# Patient Record
Sex: MALE | Race: ASIAN | Employment: UNEMPLOYED | URBAN - METROPOLITAN AREA
[De-identification: names, ages, dates, MRNs, and addresses within clinical notes are randomized per-mention and may not be internally consistent; named-entity substitution may affect disease eponyms.]

---

## 2024-10-29 ENCOUNTER — OFFICE VISIT (OUTPATIENT)
Dept: FAMILY MEDICINE CLINIC | Facility: CLINIC | Age: 34
End: 2024-10-29
Payer: MEDICAID

## 2024-10-29 VITALS
TEMPERATURE: 99 F | DIASTOLIC BLOOD PRESSURE: 64 MMHG | HEIGHT: 68 IN | SYSTOLIC BLOOD PRESSURE: 112 MMHG | HEART RATE: 118 BPM | OXYGEN SATURATION: 97 % | WEIGHT: 124 LBS | BODY MASS INDEX: 18.79 KG/M2

## 2024-10-29 DIAGNOSIS — F41.1 GAD (GENERALIZED ANXIETY DISORDER): ICD-10-CM

## 2024-10-29 DIAGNOSIS — L85.3 XEROSIS OF SKIN: ICD-10-CM

## 2024-10-29 DIAGNOSIS — R39.15 URINARY URGENCY: ICD-10-CM

## 2024-10-29 DIAGNOSIS — E78.00 HYPERCHOLESTEROLEMIA: ICD-10-CM

## 2024-10-29 DIAGNOSIS — R00.0 TACHYCARDIA: ICD-10-CM

## 2024-10-29 DIAGNOSIS — Z00.00 ROUTINE MEDICAL EXAM: ICD-10-CM

## 2024-10-29 DIAGNOSIS — E11.29 TYPE 2 DIABETES MELLITUS WITH DIABETIC MICROALBUMINURIA, WITHOUT LONG-TERM CURRENT USE OF INSULIN (HCC): Primary | ICD-10-CM

## 2024-10-29 DIAGNOSIS — F90.0 ATTENTION DEFICIT HYPERACTIVITY DISORDER (ADHD), PREDOMINANTLY INATTENTIVE TYPE: ICD-10-CM

## 2024-10-29 DIAGNOSIS — F32.A DEPRESSION, UNSPECIFIED DEPRESSION TYPE: ICD-10-CM

## 2024-10-29 DIAGNOSIS — R80.9 TYPE 2 DIABETES MELLITUS WITH DIABETIC MICROALBUMINURIA, WITHOUT LONG-TERM CURRENT USE OF INSULIN (HCC): Primary | ICD-10-CM

## 2024-10-29 LAB
ATRIAL RATE: 106 BPM
P AXIS: 68 DEGREES
P-R INTERVAL: 126 MS
Q-T INTERVAL: 312 MS
QRS DURATION: 82 MS
QTC CALCULATION (BEZET): 414 MS
R AXIS: 88 DEGREES
T AXIS: 64 DEGREES
VENTRICULAR RATE: 106 BPM

## 2024-10-29 PROCEDURE — 99205 OFFICE O/P NEW HI 60 MIN: CPT | Performed by: STUDENT IN AN ORGANIZED HEALTH CARE EDUCATION/TRAINING PROGRAM

## 2024-10-29 PROCEDURE — 99417 PROLNG OP E/M EACH 15 MIN: CPT | Performed by: STUDENT IN AN ORGANIZED HEALTH CARE EDUCATION/TRAINING PROGRAM

## 2024-10-29 PROCEDURE — 93000 ELECTROCARDIOGRAM COMPLETE: CPT | Performed by: STUDENT IN AN ORGANIZED HEALTH CARE EDUCATION/TRAINING PROGRAM

## 2024-10-29 RX ORDER — ROSUVASTATIN CALCIUM 10 MG/1
10 TABLET, COATED ORAL
COMMUNITY
End: 2024-10-29

## 2024-10-29 RX ORDER — DEXTROAMPHETAMINE SACCHARATE, AMPHETAMINE ASPARTATE MONOHYDRATE, DEXTROAMPHETAMINE SULFATE AND AMPHETAMINE SULFATE 6.25; 6.25; 6.25; 6.25 MG/1; MG/1; MG/1; MG/1
25 CAPSULE, EXTENDED RELEASE ORAL
COMMUNITY
End: 2024-10-29

## 2024-10-29 RX ORDER — ROSUVASTATIN CALCIUM 10 MG/1
10 TABLET, COATED ORAL NIGHTLY
Qty: 90 TABLET | Refills: 0 | Status: SHIPPED | OUTPATIENT
Start: 2024-10-29

## 2024-10-29 RX ORDER — METFORMIN HYDROCHLORIDE 500 MG/1
1000 TABLET, EXTENDED RELEASE ORAL
COMMUNITY
End: 2024-10-29

## 2024-10-29 RX ORDER — DEXTROAMPHETAMINE SACCHARATE, AMPHETAMINE ASPARTATE, DEXTROAMPHETAMINE SULFATE AND AMPHETAMINE SULFATE 2.5; 2.5; 2.5; 2.5 MG/1; MG/1; MG/1; MG/1
TABLET ORAL DAILY
COMMUNITY
Start: 2024-08-05

## 2024-10-29 RX ORDER — GUANFACINE 3 MG/1
3 TABLET, EXTENDED RELEASE ORAL DAILY
Qty: 30 TABLET | Refills: 0 | Status: SHIPPED | OUTPATIENT
Start: 2024-10-29

## 2024-10-29 RX ORDER — DEXTROAMPHETAMINE SACCHARATE, AMPHETAMINE ASPARTATE MONOHYDRATE, DEXTROAMPHETAMINE SULFATE AND AMPHETAMINE SULFATE 6.25; 6.25; 6.25; 6.25 MG/1; MG/1; MG/1; MG/1
25 CAPSULE, EXTENDED RELEASE ORAL EVERY MORNING
Qty: 30 CAPSULE | Refills: 0 | Status: SHIPPED | OUTPATIENT
Start: 2024-10-29

## 2024-10-29 RX ORDER — CLOBETASOL PROPIONATE 0.5 MG/G
OINTMENT TOPICAL 2 TIMES DAILY
COMMUNITY
Start: 2024-10-04

## 2024-10-29 RX ORDER — VERAPAMIL HYDROCHLORIDE 120 MG/1
120 CAPSULE, EXTENDED RELEASE ORAL
COMMUNITY
End: 2024-10-29

## 2024-10-29 RX ORDER — OMEGA-3-ACID ETHYL ESTERS 1 G/1
1 CAPSULE, LIQUID FILLED ORAL
COMMUNITY

## 2024-10-29 RX ORDER — FLUTICASONE PROPIONATE 50 MCG
SPRAY, SUSPENSION (ML) NASAL
COMMUNITY
End: 2024-10-30

## 2024-10-29 RX ORDER — LEVOMILNACIPRAN HYDROCHLORIDE 120 MG/1
120 CAPSULE, EXTENDED RELEASE ORAL DAILY
Qty: 30 CAPSULE | Refills: 0 | Status: SHIPPED | OUTPATIENT
Start: 2024-10-29

## 2024-10-29 RX ORDER — BLOOD SUGAR DIAGNOSTIC
STRIP MISCELLANEOUS DAILY
COMMUNITY
Start: 2023-05-18

## 2024-10-29 RX ORDER — DEXTROAMPHETAMINE SACCHARATE, AMPHETAMINE ASPARTATE, DEXTROAMPHETAMINE SULFATE AND AMPHETAMINE SULFATE 5; 5; 5; 5 MG/1; MG/1; MG/1; MG/1
20 TABLET ORAL
COMMUNITY
End: 2024-10-29

## 2024-10-29 RX ORDER — ROSUVASTATIN CALCIUM 5 MG/1
5 TABLET, COATED ORAL EVERY EVENING
COMMUNITY
Start: 2024-10-02 | End: 2024-10-29

## 2024-10-29 RX ORDER — METFORMIN HYDROCHLORIDE EXTENDED-RELEASE TABLETS 1000 MG/1
1000 TABLET, FILM COATED, EXTENDED RELEASE ORAL DAILY
COMMUNITY
Start: 2024-05-09 | End: 2024-10-29

## 2024-10-29 RX ORDER — BUSPIRONE HYDROCHLORIDE 10 MG/1
10 TABLET ORAL 2 TIMES DAILY
COMMUNITY
End: 2024-10-29

## 2024-10-29 RX ORDER — LISDEXAMFETAMINE DIMESYLATE 50 MG
50 CAPSULE ORAL DAILY
Qty: 30 CAPSULE | Refills: 0 | Status: SHIPPED | OUTPATIENT
Start: 2024-10-29 | End: 2024-10-30

## 2024-10-29 RX ORDER — LEVOMILNACIPRAN HYDROCHLORIDE 120 MG/1
120 CAPSULE, EXTENDED RELEASE ORAL
COMMUNITY
End: 2024-10-29

## 2024-10-29 RX ORDER — BUSPIRONE HYDROCHLORIDE 10 MG/1
10 TABLET ORAL DAILY
Qty: 30 TABLET | Refills: 0 | Status: SHIPPED | OUTPATIENT
Start: 2024-10-29

## 2024-10-29 RX ORDER — GUANFACINE 3 MG/1
3 TABLET, EXTENDED RELEASE ORAL DAILY
COMMUNITY
End: 2024-10-29

## 2024-10-29 RX ORDER — LISDEXAMFETAMINE DIMESYLATE 50 MG
50 CAPSULE ORAL
COMMUNITY
End: 2024-10-29

## 2024-10-29 RX ORDER — METFORMIN HYDROCHLORIDE 500 MG/1
1000 TABLET, EXTENDED RELEASE ORAL
Qty: 180 TABLET | Refills: 0 | Status: SHIPPED | OUTPATIENT
Start: 2024-10-29

## 2024-10-29 NOTE — PROGRESS NOTES
Subjective:   Jamal Irving is a 33 year old male who presents for Medication Follow-Up and Establish Care     33-year-old male coming in to establish care.  Used to follow-up with PCP and psychiatry in [place].    History of Type II DM, hyperlipidemia, ADHD, depression and anxiety.  Patient is a mental health counselor, currently changing careers.  After establishing with psychiatry, they started managing ADHD, depression and anxiety.  Has been on ADHD treatment since 2009. Taking Adderall XR 25 mg in the morning and Vyvanse 50 mg in the afternoon.  Also has a prescription for Adderall IR to use as needed which he has not been doing so recently.  Patient was noted to be tachycardic and states that this has been going on for approximately 4 years.  Had EKGs done that where sinus tachycardia, never referred to cardiology.  Was recommended to consider verapamil through previous PCP however did not yet initiate.  Over the past 2 months has been working out more by doing weight training.  Occasionally would feel as if muscles are weaker.  Concerned about correlation with metformin.    States historically was on atorvastatin but did not feel well on it so was switched to rosuvastatin which he has been tolerating well.  Recent microalbumin/creatinine ratio elevated however not yet started on ACE/ARB.  States up-to-date on Tdap (2 years ago).  Over the past month and a half has noted urinary urgency with no frequency.  Describes it as a sensation of having to urinate immediately with inability to hold it.  Was seen in immediate care in [place], had a urinalysis and urine culture that were negative.  Denies any concern for STDs, dysuria, hematuria.  History of dry skin around wintertime.  Saw dermatology in 2013 and was advised to hydrate the skin.  Currently using CeraVe.    History/Other:    Chief Complaint Reviewed and Verified  No Further Nursing Notes to   Review  Tobacco Reviewed  Allergies Reviewed  Medications  Reviewed    Problem List Reviewed  Medical History Reviewed  Surgical History   Reviewed  Family History Reviewed  Social History Reviewed         Tobacco:  He has never smoked tobacco.    Current Outpatient Medications   Medication Sig Dispense Refill    clobetasol 0.05 % External Ointment Apply topically 2 (two) times daily.      omega-3-acid ethyl esters 1 g Oral Cap Take 1 capsule (1 g total) by mouth.      amphetamine-dextroamphetamine 10 MG Oral Tab Take by mouth daily.      Amphetamine-Dextroamphet ER 25 MG Oral Capsule SR 24 Hr Take 1 capsule (25 mg total) by mouth every morning. 30 capsule 0    busPIRone 10 MG Oral Tab Take 1 tablet (10 mg total) by mouth daily. 30 tablet 0    FETZIMA 120 MG Oral Capsule SR 24 Hr Take 120 mg by mouth daily. 30 capsule 0    guanFACINE HCl ER 3 MG Oral Tablet 24 Hr Take 1 tablet (3 mg total) by mouth daily. 30 tablet 0    metFORMIN  MG Oral Tablet 24 Hr Take 2 tablets (1,000 mg total) by mouth daily with breakfast. 180 tablet 0    rosuvastatin 10 MG Oral Tab Take 1 tablet (10 mg total) by mouth nightly. 90 tablet 0    VYVANSE 50 MG Oral Cap Take 1 capsule (50 mg total) by mouth daily. 30 capsule 0    fluticasone propionate 50 MCG/ACT Nasal Suspension       Glucose Blood (PRECISION QID TEST) In Vitro Strip daily. (Patient not taking: Reported on 10/29/2024)           Review of Systems:  Review of Systems   Constitutional:  Negative for chills, diaphoresis and fever.   HENT:  Negative for congestion, ear discharge, ear pain, sinus pressure, sinus pain and sore throat.    Eyes:  Negative for pain and discharge.   Respiratory:  Negative for cough, chest tightness, shortness of breath and wheezing.    Cardiovascular:  Negative for chest pain and palpitations.   Gastrointestinal:  Negative for abdominal pain, diarrhea, nausea and vomiting.   Endocrine: Negative for cold intolerance and heat intolerance.   Genitourinary:  Negative for dysuria, flank pain, frequency and  urgency.   Musculoskeletal:  Negative for joint swelling.        Mild muscular discomfort.   Skin:  Negative for rash.        Dry skin.   Neurological:  Negative for dizziness, syncope and headaches.   Psychiatric/Behavioral:  Negative for confusion and hallucinations.        Objective:   /64 (BP Location: Left arm, Patient Position: Sitting, Cuff Size: adult)   Pulse 118   Temp 98.8 °F (37.1 °C) (Oral)   Ht 5' 8\" (1.727 m)   Wt 124 lb (56.2 kg)   SpO2 97%   BMI 18.85 kg/m²  Estimated body mass index is 18.85 kg/m² as calculated from the following:    Height as of this encounter: 5' 8\" (1.727 m).    Weight as of this encounter: 124 lb (56.2 kg).  Physical Exam  Constitutional:       General: He is not in acute distress.     Appearance: Normal appearance. He is not ill-appearing or toxic-appearing.   HENT:      Head: Normocephalic and atraumatic.      Right Ear: Tympanic membrane and ear canal normal.      Left Ear: Tympanic membrane and ear canal normal.      Mouth/Throat:      Mouth: Mucous membranes are moist.      Pharynx: Oropharynx is clear. No oropharyngeal exudate or posterior oropharyngeal erythema.   Cardiovascular:      Rate and Rhythm: Normal rate and regular rhythm.      Heart sounds: Normal heart sounds. No murmur heard.     No gallop.   Pulmonary:      Effort: Pulmonary effort is normal. No respiratory distress.      Breath sounds: Normal breath sounds. No stridor. No wheezing, rhonchi or rales.   Abdominal:      General: Bowel sounds are normal.      Palpations: Abdomen is soft.   Musculoskeletal:      Cervical back: Normal range of motion and neck supple.   Skin:     General: Skin is warm and dry.      Comments: Dry, scaly skin on bilateral upper and lower extremities   Neurological:      General: No focal deficit present.      Mental Status: He is alert and oriented to person, place, and time. Mental status is at baseline.   Psychiatric:         Mood and Affect: Mood normal.          Behavior: Behavior normal.         Thought Content: Thought content normal.         Judgment: Judgment normal.         Assessment & Plan:     1. Type 2 diabetes mellitus with diabetic microalbuminuria, without long-term current use of insulin ([facility]) (Primary)  Last A1c value was 5.7% done on 4/17/2024  -Repeat A1c.  Pending results may consider decreasing metformin dose.  -Diabetic eye exam and forward records.  -Repeat microalbumin/creatinine ratio should be repeated twice more over a three- to six-month period to confirm that the albumin excretion is persistently elevated.  -Initiate ACE/ARB pending above.  -     Comp Metabolic Panel (14); Future; Expected date: 10/29/2024  -     Hemoglobin A1C; Future; Expected date: 10/29/2024  -     Microalb/Creat Ratio, Random Urine; Future; Expected date: 10/29/2024  -     metFORMIN HCl ER; Take 2 tablets (1,000 mg total) by mouth daily with breakfast.  Dispense: 180 tablet; Refill: 0  2. Hypercholesterolemia  Well-controlled on rosuvastatin.  CPM.  -     Lipid Panel; Future; Expected date: 10/29/2024  -     Rosuvastatin Calcium; Take 1 tablet (10 mg total) by mouth nightly.  Dispense: 90 tablet; Refill: 0  3. SINGH (generalized anxiety disorder)  Stable, no SHIP.  CPM and reestablish care with psychiatry.  -     [facility] - EXTERNAL  -     busPIRone HCl; Take 1 tablet (10 mg total) by mouth daily.  Dispense: 30 tablet; Refill: 0  -     Fetzima; Take 120 mg by mouth daily.  Dispense: 30 capsule; Refill: 0  4. Depression, unspecified depression type  Stable, no SHIP.  CPM and reestablish care with psychiatry.  -     [facility] - EXTERNAL  -     Fetzima; Take 120 mg by mouth daily.  Dispense: 30 capsule; Refill: 0  5. Attention deficit hyperactivity disorder (ADHD), predominantly inattentive type  Patient on guanfacine, Adderall and Vyvanse.  States that ADHD is severe on lower doses or lesser medication.  -Discussed concern about tachycardia.  Will bridge patient  until follow-up with psychiatry and cardiology.  -     [facility] - EXTERNAL  -     Amphetamine-Dextroamphet ER; Take 1 capsule (25 mg total) by mouth every morning.  Dispense: 30 capsule; Refill: 0  -     guanFACINE HCl ER; Take 1 tablet (3 mg total) by mouth daily.  Dispense: 30 tablet; Refill: 0  -     Vyvanse; Take 1 capsule (50 mg total) by mouth daily.  Dispense: 30 capsule; Refill: 0  6. Tachycardia  Chronic.  States parents and sister also with tachycardia.  EKG in office sinus tachycardia at 106 bpm.  -May need treatment if continues to be on stimulants.  Cardiology follow-up.  -     ELECTROCARDIOGRAM, COMPLETE  -     CARDIO - INTERNAL  7. Urinary urgency  Was seen in immediate care at [place], urinalysis and urine culture negative.  DM well-controlled.  -KUB with urology follow-up  -      KIDNEY/BLADDER (CPT=76770); Future; Expected date: 10/29/2024  -     UROLOGY - INTERNAL  8. Xerosis of skin  -Use of mild cleansers.  -Routine use of skin moisturizers. Daily use of moisturizers, which contain substances that promote epidermal hydration.  -Avoid excessive and aggressive skin washing.  -Use of a humidifier.  -     DERM - INTERNAL  9. Routine medical exam  -     CBC, Platelet; No Differential; Future; Expected date: 10/29/2024  -     Comp Metabolic Panel (14); Future; Expected date: 10/29/2024  -     Hemoglobin A1C; Future; Expected date: 10/29/2024  -     Lipid Panel; Future; Expected date: 10/29/2024  -     TSH W Reflex To Free T4; Future; Expected date: 10/29/2024  -     Microalb/Creat Ratio, Random Urine; Future; Expected date: 10/29/2024          Return in about 4 months (around 2/28/2025).    Trevor Salgado MD, 10/29/2024, 6:29 PM           Time spent: 85 minutes, obtaining history, evaluating patient, discussing differential diagnosis, recommendations, diagnostic testing options, treatment options, risks and benefits of my recommendations, counseling patient, discussing prognosis/expectations,  and follow up with patient as well as completing documentation.

## 2024-10-31 ENCOUNTER — LAB ENCOUNTER (OUTPATIENT)
Dept: LAB | Facility: HOSPITAL | Age: 34
End: 2024-10-31
Attending: STUDENT IN AN ORGANIZED HEALTH CARE EDUCATION/TRAINING PROGRAM
Payer: MEDICAID

## 2024-10-31 ENCOUNTER — HOSPITAL ENCOUNTER (OUTPATIENT)
Dept: ULTRASOUND IMAGING | Age: 34
Discharge: HOME | End: 2024-10-31
Attending: STUDENT IN AN ORGANIZED HEALTH CARE EDUCATION/TRAINING PROGRAM
Payer: MEDICAID

## 2024-10-31 DIAGNOSIS — Z00.00 ROUTINE MEDICAL EXAM: ICD-10-CM

## 2024-10-31 DIAGNOSIS — R39.15 URINARY URGENCY: ICD-10-CM

## 2024-10-31 DIAGNOSIS — E78.00 HYPERCHOLESTEROLEMIA: ICD-10-CM

## 2024-10-31 DIAGNOSIS — E11.29 TYPE 2 DIABETES MELLITUS WITH DIABETIC MICROALBUMINURIA, WITHOUT LONG-TERM CURRENT USE OF INSULIN (HCC): ICD-10-CM

## 2024-10-31 DIAGNOSIS — R80.9 TYPE 2 DIABETES MELLITUS WITH DIABETIC MICROALBUMINURIA, WITHOUT LONG-TERM CURRENT USE OF INSULIN (HCC): ICD-10-CM

## 2024-10-31 LAB
ALBUMIN SERPL-MCNC: 4.7 G/DL (ref 3.2–4.8)
ALBUMIN/GLOB SERPL: 2.2 {RATIO} (ref 1–2)
ALP LIVER SERPL-CCNC: 52 U/L
ALT SERPL-CCNC: 18 U/L
ANION GAP SERPL CALC-SCNC: 6 MMOL/L (ref 0–18)
AST SERPL-CCNC: 17 U/L (ref ?–34)
BILIRUB SERPL-MCNC: 1.4 MG/DL (ref 0.3–1.2)
BUN BLD-MCNC: 16 MG/DL (ref 9–23)
CALCIUM BLD-MCNC: 9.8 MG/DL (ref 8.7–10.4)
CHLORIDE SERPL-SCNC: 106 MMOL/L (ref 98–112)
CHOLEST SERPL-MCNC: 115 MG/DL (ref ?–200)
CO2 SERPL-SCNC: 28 MMOL/L (ref 21–32)
CREAT BLD-MCNC: 1.04 MG/DL
CREAT UR-SCNC: 342.8 MG/DL
EGFRCR SERPLBLD CKD-EPI 2021: 97 ML/MIN/1.73M2 (ref 60–?)
ERYTHROCYTE [DISTWIDTH] IN BLOOD BY AUTOMATED COUNT: 12.3 %
EST. AVERAGE GLUCOSE BLD GHB EST-MCNC: 128 MG/DL (ref 68–126)
FASTING PATIENT LIPID ANSWER: YES
FASTING STATUS PATIENT QL REPORTED: YES
GLOBULIN PLAS-MCNC: 2.1 G/DL (ref 2–3.5)
GLUCOSE BLD-MCNC: 125 MG/DL (ref 70–99)
HBA1C MFR BLD: 6.1 % (ref ?–5.7)
HCT VFR BLD AUTO: 44.9 %
HDLC SERPL-MCNC: 57 MG/DL (ref 40–59)
HGB BLD-MCNC: 14.8 G/DL
LDLC SERPL CALC-MCNC: 47 MG/DL (ref ?–100)
MCH RBC QN AUTO: 28.3 PG (ref 26–34)
MCHC RBC AUTO-ENTMCNC: 33 G/DL (ref 31–37)
MCV RBC AUTO: 85.9 FL
MICROALBUMIN UR-MCNC: 6.8 MG/DL
MICROALBUMIN/CREAT 24H UR-RTO: 19.8 UG/MG (ref ?–30)
NONHDLC SERPL-MCNC: 58 MG/DL (ref ?–130)
OSMOLALITY SERPL CALC.SUM OF ELEC: 293 MOSM/KG (ref 275–295)
PLATELET # BLD AUTO: 235 10(3)UL (ref 150–450)
POTASSIUM SERPL-SCNC: 4.2 MMOL/L (ref 3.5–5.1)
PROT SERPL-MCNC: 6.8 G/DL (ref 5.7–8.2)
RBC # BLD AUTO: 5.23 X10(6)UL
SODIUM SERPL-SCNC: 140 MMOL/L (ref 136–145)
TRIGL SERPL-MCNC: 47 MG/DL (ref 30–149)
TSI SER-ACNC: 0.69 MIU/ML (ref 0.55–4.78)
VLDLC SERPL CALC-MCNC: 7 MG/DL (ref 0–30)
WBC # BLD AUTO: 4.7 X10(3) UL (ref 4–11)

## 2024-10-31 PROCEDURE — 80061 LIPID PANEL: CPT

## 2024-10-31 PROCEDURE — 85027 COMPLETE CBC AUTOMATED: CPT

## 2024-10-31 PROCEDURE — 82570 ASSAY OF URINE CREATININE: CPT

## 2024-10-31 PROCEDURE — 83036 HEMOGLOBIN GLYCOSYLATED A1C: CPT

## 2024-10-31 PROCEDURE — 84443 ASSAY THYROID STIM HORMONE: CPT

## 2024-10-31 PROCEDURE — 76770 US EXAM ABDO BACK WALL COMP: CPT | Performed by: STUDENT IN AN ORGANIZED HEALTH CARE EDUCATION/TRAINING PROGRAM

## 2024-10-31 PROCEDURE — 36415 COLL VENOUS BLD VENIPUNCTURE: CPT

## 2024-10-31 PROCEDURE — 80053 COMPREHEN METABOLIC PANEL: CPT

## 2024-10-31 PROCEDURE — 82043 UR ALBUMIN QUANTITATIVE: CPT

## 2024-11-04 ENCOUNTER — PATIENT MESSAGE (OUTPATIENT)
Dept: FAMILY MEDICINE CLINIC | Facility: CLINIC | Age: 34
End: 2024-11-04

## 2024-11-04 DIAGNOSIS — R80.9 TYPE 2 DIABETES MELLITUS WITH DIABETIC MICROALBUMINURIA, WITHOUT LONG-TERM CURRENT USE OF INSULIN (HCC): Primary | ICD-10-CM

## 2024-11-04 DIAGNOSIS — E11.29 TYPE 2 DIABETES MELLITUS WITH DIABETIC MICROALBUMINURIA, WITHOUT LONG-TERM CURRENT USE OF INSULIN (HCC): Primary | ICD-10-CM

## 2024-11-11 ENCOUNTER — TELEPHONE (OUTPATIENT)
Age: 34
End: 2024-11-11

## 2024-11-11 NOTE — TELEPHONE ENCOUNTER
Lvm . Patient made Video visit with eli. After looking at chart was not appropriate for eli . Eli suggested to see Beverly Cloud

## 2024-11-14 RX ORDER — OMEGA-3-ACID ETHYL ESTERS 1 G/1
1 CAPSULE, LIQUID FILLED ORAL DAILY
Qty: 30 CAPSULE | Refills: 0 | OUTPATIENT
Start: 2024-11-14

## 2024-11-14 RX ORDER — BLOOD SUGAR DIAGNOSTIC
STRIP MISCELLANEOUS DAILY
Refills: 0 | OUTPATIENT
Start: 2024-11-14

## 2024-11-14 NOTE — TELEPHONE ENCOUNTER
Patient is due for an appointment with Dr. Feldman if patient is establishing care    Pt requesting refill of   Requested Prescriptions     Pending Prescriptions Disp Refills    Glucose Blood (PRECISION QID TEST) In Vitro Strip  0     Sig: daily.     No future appointments.

## 2024-11-15 ENCOUNTER — LAB ENCOUNTER (OUTPATIENT)
Dept: LAB | Age: 34
End: 2024-11-15
Attending: STUDENT IN AN ORGANIZED HEALTH CARE EDUCATION/TRAINING PROGRAM
Payer: MEDICAID

## 2024-11-15 ENCOUNTER — OFFICE VISIT (OUTPATIENT)
Dept: INTERNAL MEDICINE CLINIC | Facility: CLINIC | Age: 34
End: 2024-11-15
Payer: MEDICAID

## 2024-11-15 VITALS
WEIGHT: 124.19 LBS | RESPIRATION RATE: 16 BRPM | BODY MASS INDEX: 18.82 KG/M2 | DIASTOLIC BLOOD PRESSURE: 62 MMHG | HEART RATE: 100 BPM | TEMPERATURE: 98 F | HEIGHT: 68 IN | SYSTOLIC BLOOD PRESSURE: 108 MMHG

## 2024-11-15 DIAGNOSIS — H57.9 EYE PRESSURE: ICD-10-CM

## 2024-11-15 DIAGNOSIS — L85.3 DRY SKIN DERMATITIS: ICD-10-CM

## 2024-11-15 DIAGNOSIS — E78.2 MIXED HYPERLIPIDEMIA: ICD-10-CM

## 2024-11-15 DIAGNOSIS — E11.9 TYPE 2 DIABETES MELLITUS WITHOUT COMPLICATION, WITHOUT LONG-TERM CURRENT USE OF INSULIN (HCC): Primary | ICD-10-CM

## 2024-11-15 DIAGNOSIS — R00.0 TACHYCARDIA: ICD-10-CM

## 2024-11-15 DIAGNOSIS — R73.09 ELEVATED HEMOGLOBIN A1C MEASUREMENT: ICD-10-CM

## 2024-11-15 DIAGNOSIS — J30.9 ALLERGIC RHINITIS, UNSPECIFIED SEASONALITY, UNSPECIFIED TRIGGER: ICD-10-CM

## 2024-11-15 DIAGNOSIS — F90.9 ATTENTION DEFICIT HYPERACTIVITY DISORDER (ADHD), UNSPECIFIED ADHD TYPE: ICD-10-CM

## 2024-11-15 PROBLEM — F41.9 ANXIETY DISORDER: Status: ACTIVE | Noted: 2018-08-01

## 2024-11-15 PROBLEM — L20.84 INTRINSIC ECZEMA: Status: ACTIVE | Noted: 2022-01-16

## 2024-11-15 PROBLEM — L65.9 HAIR THINNING: Status: ACTIVE | Noted: 2022-05-27

## 2024-11-15 LAB
T4 FREE SERPL-MCNC: 1.7 NG/DL (ref 0.8–1.7)
TSI SER-ACNC: 1.42 UIU/ML (ref 0.55–4.78)

## 2024-11-15 PROCEDURE — 84439 ASSAY OF FREE THYROXINE: CPT

## 2024-11-15 PROCEDURE — 36415 COLL VENOUS BLD VENIPUNCTURE: CPT

## 2024-11-15 PROCEDURE — 99205 OFFICE O/P NEW HI 60 MIN: CPT | Performed by: STUDENT IN AN ORGANIZED HEALTH CARE EDUCATION/TRAINING PROGRAM

## 2024-11-15 PROCEDURE — 84443 ASSAY THYROID STIM HORMONE: CPT

## 2024-11-15 RX ORDER — OMEGA-3-ACID ETHYL ESTERS 1 G/1
2 CAPSULE, LIQUID FILLED ORAL 2 TIMES DAILY
Qty: 180 CAPSULE | Refills: 0 | Status: SHIPPED | OUTPATIENT
Start: 2024-11-15

## 2024-11-15 RX ORDER — DEXTROAMPHETAMINE SACCHARATE, AMPHETAMINE ASPARTATE MONOHYDRATE, DEXTROAMPHETAMINE SULFATE AND AMPHETAMINE SULFATE 5; 5; 5; 5 MG/1; MG/1; MG/1; MG/1
CAPSULE, EXTENDED RELEASE ORAL
COMMUNITY

## 2024-11-15 RX ORDER — FLUTICASONE PROPIONATE 50 MCG
2 SPRAY, SUSPENSION (ML) NASAL DAILY
Qty: 1 EACH | Refills: 1 | Status: SHIPPED | OUTPATIENT
Start: 2024-11-15

## 2024-11-15 RX ORDER — BLOOD-GLUCOSE METER
KIT MISCELLANEOUS
COMMUNITY

## 2024-11-15 RX ORDER — LANCETS 33 GAUGE
EACH MISCELLANEOUS
COMMUNITY

## 2024-11-15 NOTE — PROGRESS NOTES
OFFICE NOTE     Patient ID: Jamal Irving is a 33 year old male.  Today's Date: 11/15/24  Chief Complaint: Lab Results and Diabetes    Patient is originally from [place]  Lived in [place] previously, I currently living in [place] for the past 3 months.   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  Not currently working.    # ADHD   # Depression and Anxiety  On treatment since 2009  Currently follows with Psychiatry, Hernando Zechariah  Takes Adderal XR (20 mg ) in am and then Vivance 50 mg .in the afternoon. Takes PRN Adderal 10 mg 1-2 times a week   Fetzima was increased to 120 mg   Also takes Guanfacine    # T2 DM  Father has diabetes since his 40s. Patient was discovered to have Diabetes during general check up.  Patient currently Takes Metformin 1000 mg a day  HbA1c (10/31/2024) - 6.1  Microalbumin/creat ratio 10/31/2024  was 19.8 (normal)  Working out, eats low carb diet     # HLD   Did not tolerate atorvastatin in the past  Currently taking Rosuvastatin 10 mg daily, tolerating well.  Lipid panel (10/31/2024): , HDL 57, TG 47, LDL 47    # Tachycardia  Patient tachycardic in the office today. States has elevated heart rate for about 4 years, was taking propranolol in the past, however it made him feel not well. It was suggested to try verapamil in the past as well.   Had EKG in the office today which showed sinus tachycardia, otherwise without significant abnormality.    # Dry skin dermatitis on b/l LE  Flairs up around wintertime.    Currently using CeraVe, however inconsistently.    Today he is also concerned about feeling of eye pressure. He would like to see Ophthalmology.       Vitals:    11/15/24 1250   BP: 108/62   Pulse: 100   Resp: 16   Temp: 97.7 °F (36.5 °C)   TempSrc: Temporal   Weight: 124 lb 3.2 oz (56.3 kg)   Height: 5' 8\" (1.727 m)     body mass index is 18.88  kg/m².  BP Readings from Last 3 Encounters:   11/15/24 108/62   10/29/24 112/64     The ASCVD Risk score (Kimani DK, et al., 2019) failed to calculate for the following reasons:    The 2019 ASCVD risk score is only valid for ages 40 to 79      Medications reviewed:  Current Outpatient Medications   Medication Sig Dispense Refill    Blood Glucose Monitoring Suppl (ONETOUCH VERIO REFLECT) w/Device Does not apply Kit       OneTouch Delica Lancets 33G Does not apply Misc       Amphetamine-Dextroamphet ER 20 MG Oral Capsule SR 24 Hr       Glucose Blood (ONETOUCH TEST VI)       fluticasone propionate 50 MCG/ACT Nasal Suspension 2 sprays by Each Nare route daily. 1 each 1    omega-3-acid ethyl esters 1 g Oral Cap Take 2 capsules (2 g total) by mouth 2 (two) times daily. 180 capsule 0    empagliflozin (JARDIANCE) 10 MG Oral Tab Take 1 tablet (10 mg total) by mouth daily. 90 tablet 0    empagliflozin (JARDIANCE) 10 MG Oral Tab Take 1 tablet (10 mg total) by mouth daily. JARDIANCE 10MG  LOT: number [id number]  NDC: 6126-5155-31  EXP DATE: 01/31/2026  : BOEHRINGER INGELVendRx 21 tablet 0    empagliflozin (JARDIANCE) 10 MG Oral Tab Take 1 tablet (10 mg total) by mouth daily. JARDIANCE 10MG  LOT: [id number]  NDC: 2771-7070-95  EXP DATE: 11/30/2026  : BOEHRINGER INGELHEIM 7 tablet 0    lisdexamfetamine (VYVANSE) 50 MG Oral Cap Take 1 capsule (50 mg total) by mouth daily. 30 capsule 0    clobetasol 0.05 % External Ointment Apply topically 2 (two) times daily.      amphetamine-dextroamphetamine 10 MG Oral Tab Take by mouth daily.      busPIRone 10 MG Oral Tab Take 1 tablet (10 mg total) by mouth daily. (Patient taking differently: Take 1 tablet (10 mg total) by mouth 2 (two) times daily.) 30 tablet 0    FETZIMA 120 MG Oral Capsule SR 24 Hr Take 120 mg by mouth daily. 30 capsule 0    guanFACINE HCl ER 3 MG Oral Tablet 24 Hr Take 1 tablet (3 mg total) by mouth daily. 30 tablet 0    metFORMIN  MG Oral Tablet 24  Hr Take 2 tablets (1,000 mg total) by mouth daily with breakfast. 180 tablet 0    rosuvastatin 10 MG Oral Tab Take 1 tablet (10 mg total) by mouth nightly. (Patient taking differently: Take 1 tablet (10 mg total) by mouth daily.) 90 tablet 0         Assessment & Plan    1. Type 2 diabetes mellitus without complication, without long-term current use of insulin ([facility]) (Primary)  Diabetes is currently well controled with recent HbA1c was 6.1. However patient would like a better control of HbA1c , which is appropriate given his age.  Will  start him on Jardiance 10 mg daily for the next month. If tolerates well, will continue with prescription and will recheck his Hb A1c in 3 months. Should check his am sugars several times a week, will send supplies.  -     Empagliflozin; Take 1 tablet (10 mg total) by mouth daily.  Dispense: 90 tablet; Refill: 0  -     Diabetic Test Strips and Supplies  -     Ophthalmology Referral - In Network    2. Mixed hyperlipidemia  Should continue current management with Omega 3 fatty acids and Rosuvastatin 10 mg daily  -     Omega-3-acid Ethyl Esters; Take 2 capsules (2 g total) by mouth 2 (two) times daily.  Dispense: 180 capsule; Refill: 0    3. Elevated hemoglobin A1c measurement  -     Diabetic Test Strips and Supplies    4. Tachycardia  EKG in the office today showed sinus tachycardia. Inappropriate tachycardia?  TSH was normal few months ago. Will check TSH again with free T4.  Did not tolerate Propranolol in the past. Patient was referred to cardiology in the past. Recommend patient to follow up with cardiology as a different b blocker or other agent could be initiated. Might need Holter monitoring to determine average heart rate and ECHO to assess cardiac function  -     Free T4, (Free Thyroxine); Future; Expected date: 11/15/2024  -     Assay, Thyroid Stim Hormone; Future; Expected date: 11/15/2024    5. Allergic rhinitis, unspecified seasonality, unspecified trigger  -     Fluticasone  Propionate; 2 sprays by Each Nare route daily.  Dispense: 1 each; Refill: 1    6. Eye pressure  Patient feels increased pressure in his eyes. Will refer to ophthalmology for further evaluation.  -     Ophthalmology Referral - In Network    7. Dry skin dermatitis  -     Derm Referral - In Network    8. ADHD   Continue follow up with Psychiatry     Other orders  Patient was provided with 4 weeks supply of Jardiance.  -     Empagliflozin; Take 1 tablet (10 mg total) by mouth daily. JARDIANCE 10MG  LOT: number [id number]  NDC: 3360-4317-08  EXP DATE: 01/31/2026  : MasterImage 3D  Dispense: 21 tablet; Refill: 0  -     Empagliflozin; Take 1 tablet (10 mg total) by mouth daily. JARDIANCE 10MG  LOT: [id number]  NDC: 0208-9328-04  EXP DATE: 11/30/2026  : MasterImage 3D  Dispense: 7 tablet; Refill: 0        Follow Up: As needed/if symptoms worsen or Return in about 3 months (around 2/15/2025) for diabetes follow up..     I spent 65 minutes obtaining pertitent medical history, reviewing pertinent imaging/labs and specialists notes, evaluating patient, discussing differential diagnosis' and various treatment options, reinforcing importance of compliance with treatment plan, and completing documentation.          There is a longitudinal care relationship with me, the care plan reflects the ongoing nature of the continuous relationship of care, and the medical record indicates that there is ongoing treatment of a serious/complex medical condition which I am currently managing.  is Applicable.     Objective/ Results:   Physical Exam  Constitutional:       General: He is not in acute distress.     Appearance: Normal appearance. He is not ill-appearing or toxic-appearing.   HENT:      Head: Normocephalic and atraumatic.      Mouth/Throat:      Pharynx: No oropharyngeal exudate or posterior oropharyngeal erythema.   Eyes:      Extraocular Movements: Extraocular movements intact.       Conjunctiva/sclera: Conjunctivae normal.      Pupils: Pupils are equal, round, and reactive to light.   Cardiovascular:      Rate and Rhythm: Regular rhythm. Tachycardia present.      Pulses:           Dorsalis pedis pulses are 2+ on the right side and 2+ on the left side.        Posterior tibial pulses are 2+ on the right side and 2+ on the left side.      Heart sounds: Normal heart sounds. No murmur heard.     No friction rub. No gallop.   Pulmonary:      Effort: Pulmonary effort is normal. No respiratory distress.      Breath sounds: Normal breath sounds. No stridor. No wheezing, rhonchi or rales.   Abdominal:      General: Abdomen is flat.   Musculoskeletal:         General: No swelling.      Cervical back: Normal range of motion. No rigidity.      Right lower leg: No edema.      Left lower leg: No edema.      Right foot: Normal range of motion. No deformity or foot drop.      Left foot: Normal range of motion.   Feet:      Right foot:      Protective Sensation:   7 sites sensed.      Skin integrity: Dry skin present.      Left foot:      Protective Sensation:   7 sites sensed.      Skin integrity: Dry skin present.   Lymphadenopathy:      Cervical: No cervical adenopathy.   Skin:     General: Skin is warm.      Capillary Refill: Capillary refill takes less than 2 seconds.      Coloration: Skin is not jaundiced.   Neurological:      General: No focal deficit present.      Mental Status: He is alert and oriented to person, place, and time. Mental status is at baseline.   Psychiatric:         Mood and Affect: Mood normal.         Behavior: Behavior normal.         Thought Content: Thought content normal.         Judgment: Judgment normal.        Bilateral barefoot skin diabetic exam is normal, visualized feet and the appearance is abnormal due to the presence of Dry skin dermatitis.  Bilateral monofilament/sensation of both feet is normal.  Pulsation pedal pulse exam of both lower legs/feet is normal as well.      Reviewed:    Patient Active Problem List    Diagnosis    Dry skin dermatitis    Eye pressure    Tachycardia    Mixed hyperlipidemia    Type 2 diabetes mellitus without complication, without long-term current use of insulin (HCC)    Hair thinning    Intrinsic eczema    Anxiety disorder    Depression    Attention deficit hyperactivity disorder (ADHD)      Allergies[1]     Social History     Socioeconomic History    Marital status: Single   Tobacco Use    Smoking status: Never     Passive exposure: Never    Smokeless tobacco: Never   Vaping Use    Vaping status: Never Used   Substance and Sexual Activity    Alcohol use: Not Currently     Comment: quit 6 years ago    Drug use: Never   Other Topics Concern    Caffeine Concern No    Exercise Yes     Comment: feels a little weaker    Seat Belt No    Special Diet No    Stress Concern No    Weight Concern No      Review of Systems   Constitutional: Negative.  Negative for fatigue and fever.   HENT: Negative.     Eyes: Negative.    Respiratory: Negative.     Cardiovascular:         Positive for constantly elevated heart rate   Gastrointestinal: Negative.  Negative for abdominal pain, constipation, diarrhea, nausea and vomiting.   Endocrine: Negative.    Skin:         Positive for Dry skin     All other systems negative unless otherwise stated in ROS or HPI above.       Irina Nowak MD  Internal Medicine       Call office with any questions or seek emergency care if necessary.   Patient understands and agrees to follow directions and advice.      ----------------------------------------- PATIENT INSTRUCTIONS-----------------------------------------     There are no Patient Instructions on file for this visit.        The 21st Century Cures Act makes medical notes available to patients in the interest of transparency.  However, please be advised that this is a medical document.  It is intended as a peer to peer communication.  It is written in medical language and may contain  abbreviations or verbiage that are technical and unfamiliar.  It may appear blunt or direct.  Medical documents are intended to carry relevant information, facts as evident, and the clinical opinion of the practitioner.        [1]   Allergies  Allergen Reactions    Bupropion ANXIETY and Tightness in Chest    Clonidine CONFUSION, DIZZINESS and FATIGUE    Omeprazole NAUSEA AND VOMITING    Propranolol DIZZINESS     Disorientation    Atomoxetine OTHER (SEE COMMENTS)    Carbamazepine OTHER (SEE COMMENTS)    Citalopram OTHER (SEE COMMENTS)     Not effective    Desipramine OTHER (SEE COMMENTS)     Excessive sleepiness    Methylphenidate OTHER (SEE COMMENTS)    Oxcarbazepine OTHER (SEE COMMENTS)    Thiothixene OTHER (SEE COMMENTS)    Vilazodone OTHER (SEE COMMENTS)    Escitalopram FATIGUE and INSOMNIA    Fluoxetine FATIGUE    Paroxetine FATIGUE    Sertraline FATIGUE    Venlafaxine FATIGUE

## 2024-11-18 ENCOUNTER — TELEPHONE (OUTPATIENT)
Dept: CARDIOLOGY | Age: 34
End: 2024-11-18

## 2024-11-20 ENCOUNTER — TELEPHONE (OUTPATIENT)
Dept: INTERNAL MEDICINE CLINIC | Facility: CLINIC | Age: 34
End: 2024-11-20

## 2024-11-20 DIAGNOSIS — R00.0 TACHYCARDIA: Primary | ICD-10-CM

## 2024-11-20 DIAGNOSIS — E78.2 MIXED HYPERLIPIDEMIA: ICD-10-CM

## 2024-11-20 NOTE — TELEPHONE ENCOUNTER
Insurance: medicaid  Provider's Name?: Dr Rg Britton   Provider's Specialty?: Cardio     Reason for Visit?: NP    Diagnosis?:    Number of Visits Requested?: 4    Last Visit with Specialist?: none   Is Appt. Already Scheduled?:  no        If so, Date?:    Once referral is approved [facility]

## 2024-11-20 NOTE — TELEPHONE ENCOUNTER
Incoming (mail or fax):  fax   Received from:  [facility]   Documentation given to:  triage in     Med records OV note, and any cardio testing   Sent to you and not med records not much for records needed

## 2024-11-21 PROBLEM — E11.9 TYPE 2 DIABETES MELLITUS WITHOUT COMPLICATION, WITHOUT LONG-TERM CURRENT USE OF INSULIN (HCC): Status: ACTIVE | Noted: 2023-02-10

## 2024-11-21 PROBLEM — R00.0 TACHYCARDIA: Status: ACTIVE | Noted: 2024-04-17

## 2024-11-21 PROBLEM — L85.3 DRY SKIN DERMATITIS: Status: ACTIVE | Noted: 2024-11-21

## 2024-11-21 PROBLEM — H57.9 EYE PRESSURE: Status: ACTIVE | Noted: 2024-11-21

## 2024-11-21 NOTE — TELEPHONE ENCOUNTER
Referral placed. Please finish the OV note from 11/15  Form in triage with EKG  Please let us know when OV note finished so we can fax it as well. Thank you

## 2024-11-22 NOTE — TELEPHONE ENCOUNTER
Faxed last OV note with our office, labs (ones done on 11/15 from our office and labs done on 10/31 by dr. Salgado), EKG, and referral per provider request. Faxed to keily sauceda at [phone] at dr. Britton office. Rec confirmation  For any questions call them at [phone]. Form sent for scanning

## 2024-11-25 ENCOUNTER — HOSPITAL ENCOUNTER (OUTPATIENT)
Dept: CT IMAGING | Facility: HOSPITAL | Age: 34
End: 2024-11-25
Attending: STUDENT IN AN ORGANIZED HEALTH CARE EDUCATION/TRAINING PROGRAM

## 2024-11-25 VITALS — BODY MASS INDEX: 18.79 KG/M2 | HEIGHT: 67.99 IN | WEIGHT: 124 LBS

## 2024-11-25 DIAGNOSIS — Z13.6 SCREENING FOR CARDIOVASCULAR CONDITION: ICD-10-CM

## 2024-11-26 DIAGNOSIS — R93.89 ABNORMAL CHEST CT: Primary | ICD-10-CM

## 2024-11-26 NOTE — PROGRESS NOTES
Date of Service 11/25/2024    CAROLYN ADAN  Date of Birth 12/6/1990    Patient Age: 33 year old    PCP: Irina Nowak MD  [address] [facility], [place]  [address] [place] [id number]    Heart Scan Consult  Preliminary Heart Scan Score: 0    Previous Screening  Heart Scan Completed Previously: No        Peripheral Vascular Scan Completed Previously: No          Risk Factors  Personal Risk Factors  Non-alterable Risk Factors: Family History (Father has high cholesterol, patient is diabetic)  Alterable Risk Factors: Diabetes;Stress      Body Mass Index  Body mass index is 18.86 kg/m².    Blood Pressure  Blood Pressure measurement declined during this encounter.  (Normal =< 120/80,  Elevated = 120-129/ >80,  High Stage1 130-139/80-89 , Stage2 >140/>90)    Lipid Profile  Cholesterol: 115, done on 10/31/2024.  HDL Cholesterol: 57, done on 10/31/2024.  LDL Cholesterol: 47, done on 10/31/2024.  TriGlycerides 47, done on 10/31/2024.    Cholesterol Goals  Value   Total  =< 200   HDL  = > 45 Men = > 55 Women   LDL   =< 100   Triglycerides  =< 150       Glucose and Hemoglobin A1C  Lab Results   Component Value Date     (H) 10/31/2024    A1C 6.1 (H) 10/31/2024     (Normal Fasting Glucose < 100mg/dl )    Nurse Review  Risk factor information and results reviewed with Nurse: Yes    Recommended Follow Up:  Consult your physician regarding:: Final Heart Scan Report;Discuss potential for Incidental Finding;Discuss Potential for Score Variance      Recommendations for Change:  Nutrition Changes: No Change Needed (Eats mostly chicken, some beef and fish. Some ghee and encouraged to choose another oil source)    Cholesterol Modification (goal of therapy depends upon your risk): No Change Needed    Exercise: Enhance Current Program (Uses weights and encouraged to increase cardio exercise.)    Smoking Cessation: No Change Needed    Weight Management: Maintain Current Weight    Stress Management: Adopt Stress Management Techniques    Repeat  Heart Scan: 5 years if Calcium Score is 0.0;Discuss with your Physician              [facility] Recommended Resources:  Recommended Resources: Upcoming Classes, Medical Services and Health Library [email or web address]            Shelby MENDEZ RN        Please Contact the Nurse Heart Line with any Questions or Concerns [phone].

## 2024-11-27 ENCOUNTER — TELEPHONE (OUTPATIENT)
Dept: INTERNAL MEDICINE CLINIC | Facility: CLINIC | Age: 34
End: 2024-11-27

## 2024-11-27 ENCOUNTER — TELEPHONE (OUTPATIENT)
Facility: CLINIC | Age: 34
End: 2024-11-27

## 2024-11-27 DIAGNOSIS — E11.9 TYPE 2 DIABETES MELLITUS WITHOUT COMPLICATION, WITHOUT LONG-TERM CURRENT USE OF INSULIN (HCC): Primary | ICD-10-CM

## 2024-11-27 DIAGNOSIS — Z13.5 SCREENING FOR EYE CONDITION: ICD-10-CM

## 2024-11-27 NOTE — TELEPHONE ENCOUNTER
Call from Jyoti at [facility] [phone], she states referral was put in wrong, no diagnosis, no destination, and it says enternal.

## 2024-11-27 NOTE — TELEPHONE ENCOUNTER
Received referral from Dr. Nowak's office for Abnormal chest CT. Shreya PSR notified. Per Shreya, patient will be seen by Dr. Carbone, patient placed on wait list.

## 2024-11-27 NOTE — TELEPHONE ENCOUNTER
Talked to [facility] and they need external referral. Referral placed. And faxed [phone].

## 2024-12-02 ENCOUNTER — TELEPHONE (OUTPATIENT)
Dept: INTERNAL MEDICINE CLINIC | Facility: CLINIC | Age: 34
End: 2024-12-02

## 2024-12-02 NOTE — TELEPHONE ENCOUNTER
Initiated PA but received this message automatically:Closed   12/2/2024  5:08 PM  Close reason: Prescription within prescribing limits. Prior Authorization not required.   Note from payer: No PA required, Prescription is within prescribing limits.   Payer: [employer] [facility] Case ID: [id number]    [phone]    Faxed back advising med is covered by plan and PA is not needed.

## 2024-12-02 NOTE — TELEPHONE ENCOUNTER
Incoming (mail or fax):  fax  Received from:  pill pack  Documentation given to:  triage in    PA for rosuvastatin calcium

## 2024-12-03 ENCOUNTER — OFFICE VISIT (OUTPATIENT)
Dept: INTERNAL MEDICINE CLINIC | Facility: CLINIC | Age: 34
End: 2024-12-03
Payer: MEDICAID

## 2024-12-03 VITALS
HEART RATE: 102 BPM | RESPIRATION RATE: 14 BRPM | OXYGEN SATURATION: 99 % | HEIGHT: 68 IN | BODY MASS INDEX: 18.49 KG/M2 | SYSTOLIC BLOOD PRESSURE: 98 MMHG | TEMPERATURE: 97 F | DIASTOLIC BLOOD PRESSURE: 68 MMHG | WEIGHT: 122 LBS

## 2024-12-03 DIAGNOSIS — R00.2 RAPID PALPITATIONS: Primary | ICD-10-CM

## 2024-12-03 DIAGNOSIS — E11.9 TYPE 2 DIABETES MELLITUS WITHOUT COMPLICATION, WITHOUT LONG-TERM CURRENT USE OF INSULIN (HCC): ICD-10-CM

## 2024-12-03 DIAGNOSIS — R42 LIGHTHEADEDNESS: ICD-10-CM

## 2024-12-03 DIAGNOSIS — I47.11 INAPPROPRIATE SINUS TACHYCARDIA (HCC): ICD-10-CM

## 2024-12-03 DIAGNOSIS — R68.89 EXERCISE INTOLERANCE: ICD-10-CM

## 2024-12-03 PROCEDURE — 99214 OFFICE O/P EST MOD 30 MIN: CPT | Performed by: STUDENT IN AN ORGANIZED HEALTH CARE EDUCATION/TRAINING PROGRAM

## 2024-12-03 NOTE — PROGRESS NOTES
OFFICE NOTE     Patient ID: Jamal Irving is a 33 year old male.  Today's Date: 12/03/24  Chief Complaint: Follow - Up (Pt has been experiencing tachycardia during workouts. Pt by Dr. Salgado 10/29/24 and had EKG done  Was referred to cardiologist./)    Patient is a 33-year-old male with PMH of ADHD, depression anxiety, type 2 diabetes, HLD, tachycardia, dry skin dermatitis and eczema of bilateral lower extremities, who presents to the office today for the evaluation of elevated heart rate and palpitations.    He had an episode when his HR was elevated to 180s. This happened while he was in the gym lifting weights , then suddenly felt palpitations , lightheaded and weak. no LOC. He did eat prior to exercising.  He needed to sit down and wait for a while for the symptoms to improve. He states that his father and his sister also have elevated Heart rate and they both take Bisoprolol. Patient is very concerned as his Heart rate is elevted at baseline this episodes with palpitations have happened in the past as well. He was on propranolol in the past, however did not tolerate it (dizziness and disorientation).  He never had Holter monitor in the past.   His most recent EKG on 10/29/2024 showe sinus tachycardia.     Today patient feeling better his HR in the office today is 102.   No chest pain, no SOB, no dizziness or lightheadedness.      Vitals:    12/03/24 1509   BP: 98/68   Pulse: 102   Resp: 14   Temp: 97.1 °F (36.2 °C)   TempSrc: Temporal   SpO2: 99%   Weight: 122 lb (55.3 kg)   Height: 5' 8\" (1.727 m)     body mass index is 18.55 kg/m².  BP Readings from Last 3 Encounters:   12/03/24 98/68   11/15/24 108/62   10/29/24 112/64     The ASCVD Risk score (Kimani HDZ, et al., 2019) failed to calculate for the following reasons:    The 2019 ASCVD risk score is only valid for ages 40 to 79      Medications reviewed:  Current Outpatient Medications   Medication Sig Dispense Refill    empagliflozin 10 MG Oral  Tab Take 1 tablet (10 mg total) by mouth daily. 90 tablet 1    rosuvastatin 10 MG Oral Tab Take 1 tablet (10 mg total) by mouth daily. 90 tablet 0    Blood Glucose Monitoring Suppl (ONETOUCH VERIO REFLECT) w/Device Does not apply Kit       OneTouch Delica Lancets 33G Does not apply Misc       Amphetamine-Dextroamphet ER 20 MG Oral Capsule SR 24 Hr       Glucose Blood (ONETOUCH TEST VI)       fluticasone propionate 50 MCG/ACT Nasal Suspension 2 sprays by Each Nare route daily. 1 each 1    omega-3-acid ethyl esters 1 g Oral Cap Take 2 capsules (2 g total) by mouth 2 (two) times daily. 180 capsule 0    lisdexamfetamine (VYVANSE) 50 MG Oral Cap Take 1 capsule (50 mg total) by mouth daily. 30 capsule 0    clobetasol 0.05 % External Ointment Apply topically 2 (two) times daily.      amphetamine-dextroamphetamine 10 MG Oral Tab Take by mouth as needed.      busPIRone 10 MG Oral Tab Take 1 tablet (10 mg total) by mouth daily. 30 tablet 0    FETZIMA 120 MG Oral Capsule SR 24 Hr Take 120 mg by mouth daily. 30 capsule 0    guanFACINE HCl ER 3 MG Oral Tablet 24 Hr Take 1 tablet (3 mg total) by mouth daily. 30 tablet 0    metFORMIN  MG Oral Tablet 24 Hr Take 2 tablets (1,000 mg total) by mouth daily with breakfast. 180 tablet 0         Assessment & Plan    1. Rapid palpitations (Primary)  2. Inappropriate sinus tachycardia (HCC)  3. Lightheadedness  4. Exercise intolerance  Patient with h/o elevated heart rate for years with recurrent episodes of palpitations with increased heart rate up to 180s during his regular exercising, associated with dizziness and lightheadedness. Patient had similar episodes in the past and was taking propranolol, however was unable to tolerate. He will benefit from Holter monitor for 7 days to establish baseline Heart rate and rule out underlying arrhythmias or SVT.   Patient will also see cardiology soon.   After cardiac monitor, will start him on appropriate treatment.  - CARD ZIO EXTENDED  MONITOR 3-7 DAYS (CPT=93242/34691); Future    5. Type 2 diabetes mellitus without complication, without long-term current use of insulin (HCC)  -     Empagliflozin; Take 1 tablet (10 mg total) by mouth daily.  Dispense: 90 tablet; Refill: 1      Follow Up: As needed/if symptoms worsen     I spent 30 minutes obtaining pertitent medical history, reviewing pertinent imaging/labs and specialists notes, evaluating patient, discussing differential diagnosis' and various treatment options, reinforcing importance of compliance with treatment plan, and completing documentation.     There is a longitudinal care relationship with me, the care plan reflects the ongoing nature of the continuous relationship of care, and the medical record indicates that there is ongoing treatment of a serious/complex medical condition which I am currently managing.  is Applicable.     Objective/ Results:   Physical Exam  Constitutional:       General: He is not in acute distress.     Appearance: Normal appearance. He is normal weight. He is not ill-appearing or toxic-appearing.   HENT:      Head: Normocephalic and atraumatic.   Eyes:      Extraocular Movements: Extraocular movements intact.      Conjunctiva/sclera: Conjunctivae normal.      Pupils: Pupils are equal, round, and reactive to light.   Cardiovascular:      Rate and Rhythm: Regular rhythm. Tachycardia present.      Pulses: Normal pulses.      Heart sounds: Normal heart sounds. No murmur heard.     No friction rub. No gallop.   Pulmonary:      Effort: Pulmonary effort is normal. No respiratory distress.      Breath sounds: Normal breath sounds. No wheezing or rales.   Abdominal:      General: Abdomen is flat.   Skin:     Capillary Refill: Capillary refill takes less than 2 seconds.   Neurological:      General: No focal deficit present.      Mental Status: He is alert and oriented to person, place, and time.      Cranial Nerves: No cranial nerve deficit.      Motor: No weakness.    Psychiatric:         Mood and Affect: Mood normal.         Behavior: Behavior normal.         Thought Content: Thought content normal.         Judgment: Judgment normal.        Reviewed:    Patient Active Problem List    Diagnosis    Inappropriate sinus tachycardia (HCC)    Rapid palpitations    Lightheadedness    Dry skin dermatitis    Eye pressure    Tachycardia    Mixed hyperlipidemia    Type 2 diabetes mellitus without complication, without long-term current use of insulin (HCC)    Hair thinning    Intrinsic eczema    Anxiety disorder    Depression    Attention deficit hyperactivity disorder (ADHD)      Allergies[1]     Social History     Socioeconomic History    Marital status: Single   Tobacco Use    Smoking status: Never     Passive exposure: Never    Smokeless tobacco: Never   Vaping Use    Vaping status: Never Used   Substance and Sexual Activity    Alcohol use: Not Currently     Comment: quit 6 years ago    Drug use: Never   Other Topics Concern    Caffeine Concern No    Exercise Yes     Comment: feels a little weaker    Seat Belt No    Special Diet No    Stress Concern No    Weight Concern No      Review of Systems   Constitutional: Negative.  Negative for fatigue and fever.   HENT: Negative.     Eyes: Negative.    Respiratory: Negative.  Negative for choking, chest tightness, shortness of breath and wheezing.    Cardiovascular:  Positive for palpitations. Negative for chest pain and leg swelling.        Positive for constantly elevated heart rate   Gastrointestinal: Negative.  Negative for abdominal pain, anal bleeding, blood in stool, constipation, diarrhea, nausea and vomiting.   Endocrine: Negative.    Genitourinary: Negative.    Musculoskeletal: Negative.    Skin:         Positive for Dry skin   Neurological:  Positive for dizziness (during the episode of palpitations) and light-headedness (while he had an episode of palpitations). Negative for tremors, seizures, syncope, speech difficulty,  weakness and numbness.   Hematological: Negative.    Psychiatric/Behavioral:  The patient is nervous/anxious.      All other systems negative unless otherwise stated in ROS or HPI above.       Irina Nowak MD  Internal Medicine       Call office with any questions or seek emergency care if necessary.   Patient understands and agrees to follow directions and advice.      ----------------------------------------- PATIENT INSTRUCTIONS-----------------------------------------     There are no Patient Instructions on file for this visit.        The 21st Century Cures Act makes medical notes available to patients in the interest of transparency.  However, please be advised that this is a medical document.  It is intended as a peer to peer communication.  It is written in medical language and may contain abbreviations or verbiage that are technical and unfamiliar.  It may appear blunt or direct.  Medical documents are intended to carry relevant information, facts as evident, and the clinical opinion of the practitioner.          [1]   Allergies  Allergen Reactions    Bupropion ANXIETY and Tightness in Chest    Clonidine CONFUSION, DIZZINESS and FATIGUE    Omeprazole NAUSEA AND VOMITING    Propranolol DIZZINESS     Disorientation    Atomoxetine OTHER (SEE COMMENTS)    Carbamazepine OTHER (SEE COMMENTS)    Citalopram OTHER (SEE COMMENTS)     Not effective    Desipramine OTHER (SEE COMMENTS)     Excessive sleepiness    Methylphenidate OTHER (SEE COMMENTS)    Oxcarbazepine OTHER (SEE COMMENTS)    Thiothixene OTHER (SEE COMMENTS)    Vilazodone OTHER (SEE COMMENTS)    Escitalopram FATIGUE and INSOMNIA    Fluoxetine FATIGUE    Paroxetine FATIGUE    Sertraline FATIGUE    Venlafaxine FATIGUE

## 2024-12-04 ENCOUNTER — PATIENT MESSAGE (OUTPATIENT)
Dept: INTERNAL MEDICINE CLINIC | Facility: CLINIC | Age: 34
End: 2024-12-04

## 2024-12-04 NOTE — TELEPHONE ENCOUNTER
Per OV notes:4. Exercise intolerance  Patient with h/o elevated heart rate for years with recurrent episodes of palpitations with increased heart rate up to 180s during his regular exercising, associated with dizziness and lightheadedness. Patient had similar episodes in the past and was taking propranolol, however was unable to tolerate. He will benefit from Holter monitor for 7 days to establish baseline Heart rate and rule out underlying arrhythmias or SVT.     Do you want pt to complete the 3-7 day holter monitor as ordered? Thanks!

## 2024-12-05 ENCOUNTER — TELEPHONE (OUTPATIENT)
Dept: INTERNAL MEDICINE CLINIC | Facility: CLINIC | Age: 34
End: 2024-12-05

## 2024-12-05 DIAGNOSIS — E11.29 TYPE 2 DIABETES MELLITUS WITH DIABETIC MICROALBUMINURIA, WITHOUT LONG-TERM CURRENT USE OF INSULIN (HCC): ICD-10-CM

## 2024-12-05 DIAGNOSIS — R80.9 TYPE 2 DIABETES MELLITUS WITH DIABETIC MICROALBUMINURIA, WITHOUT LONG-TERM CURRENT USE OF INSULIN (HCC): ICD-10-CM

## 2024-12-05 RX ORDER — METFORMIN HYDROCHLORIDE 500 MG/1
1000 TABLET, EXTENDED RELEASE ORAL
Qty: 180 TABLET | Refills: 0 | Status: SHIPPED | OUTPATIENT
Start: 2024-12-05

## 2024-12-05 NOTE — TELEPHONE ENCOUNTER
Pt asking for metformin to be send to [facility] by [facility].  I see metformin is prescribed by Dr Salgado  family practice. Ok to fill it ?     Rx pended

## 2024-12-05 NOTE — TELEPHONE ENCOUNTER
Incoming (mail or fax):  fax  Received from:  [facility]  Documentation given to:  Triage incoming    New prescription request

## 2024-12-06 ENCOUNTER — TELEPHONE (OUTPATIENT)
Dept: INTERNAL MEDICINE CLINIC | Facility: CLINIC | Age: 34
End: 2024-12-06

## 2024-12-06 NOTE — TELEPHONE ENCOUNTER
Faxed back signed form to [facility], fax confirmed and sent for scanning.

## 2024-12-06 NOTE — TELEPHONE ENCOUNTER
Incoming (mail or fax):  fax  Received from:  [place] [employer]  Documentation given to:  triage in    Prior auth for rosuvastatin calcium

## 2024-12-07 NOTE — TELEPHONE ENCOUNTER
Incoming (mail or fax):  fax  Received from:  [facility]  Documentation given to:  triage in      No review needed ok to fill

## 2024-12-09 ENCOUNTER — TELEPHONE (OUTPATIENT)
Dept: INTERNAL MEDICINE CLINIC | Facility: CLINIC | Age: 34
End: 2024-12-09

## 2024-12-09 NOTE — TELEPHONE ENCOUNTER
Incoming (mail or fax):  fax  Received from:  pill pack  Documentation given to:  triage in    Prior auth for omega 3,metformin,jardiance

## 2024-12-10 DIAGNOSIS — F90.0 ATTENTION DEFICIT HYPERACTIVITY DISORDER (ADHD), PREDOMINANTLY INATTENTIVE TYPE: ICD-10-CM

## 2024-12-10 RX ORDER — GUANFACINE 3 MG/1
3 TABLET, EXTENDED RELEASE ORAL DAILY
Qty: 30 TABLET | Refills: 0 | Status: SHIPPED | OUTPATIENT
Start: 2024-12-10

## 2024-12-10 NOTE — TELEPHONE ENCOUNTER
A refill request was received for:  Requested Prescriptions     Pending Prescriptions Disp Refills    GUANFACINE HCL ER 3 MG Oral Tablet 24 Hr [Pharmacy Med Name: Guanfacine Hydrochloride Er 24hr 3 Mg Tab Nataly] 30 tablet 0     Sig: Take 1 tablet by mouth daily.     Last refill date:  10/29/24  Qty: 30 and 0   Dx: ADHD  Last office visit: 10/29/24  When is follow up due: 2/28/25    Future Appointments   Date Time Provider Department Center   12/11/2024  3:00 [facility]   1/15/2025 11:20 AM Greg Carbone MD [facility] [id number] [facility] [place]

## 2024-12-11 ENCOUNTER — HOSPITAL ENCOUNTER (OUTPATIENT)
Dept: CV DIAGNOSTICS | Facility: HOSPITAL | Age: 34
Discharge: HOME | End: 2024-12-11
Attending: STUDENT IN AN ORGANIZED HEALTH CARE EDUCATION/TRAINING PROGRAM
Payer: MEDICAID

## 2024-12-11 DIAGNOSIS — I47.11 INAPPROPRIATE SINUS TACHYCARDIA (HCC): ICD-10-CM

## 2024-12-11 DIAGNOSIS — R68.89 EXERCISE INTOLERANCE: ICD-10-CM

## 2024-12-11 DIAGNOSIS — R42 LIGHTHEADEDNESS: ICD-10-CM

## 2024-12-11 DIAGNOSIS — R00.2 RAPID PALPITATIONS: ICD-10-CM

## 2024-12-11 PROCEDURE — 93243 EXT ECG>48HR<7D SCAN A/R: CPT | Performed by: STUDENT IN AN ORGANIZED HEALTH CARE EDUCATION/TRAINING PROGRAM

## 2024-12-11 PROCEDURE — 93242 EXT ECG>48HR<7D RECORDING: CPT | Performed by: STUDENT IN AN ORGANIZED HEALTH CARE EDUCATION/TRAINING PROGRAM

## 2024-12-12 ENCOUNTER — TELEPHONE (OUTPATIENT)
Dept: INTERNAL MEDICINE CLINIC | Facility: CLINIC | Age: 34
End: 2024-12-12

## 2024-12-12 DIAGNOSIS — E11.29 TYPE 2 DIABETES MELLITUS WITH DIABETIC MICROALBUMINURIA, WITHOUT LONG-TERM CURRENT USE OF INSULIN (HCC): ICD-10-CM

## 2024-12-12 DIAGNOSIS — E78.00 HYPERCHOLESTEROLEMIA: ICD-10-CM

## 2024-12-12 DIAGNOSIS — E78.2 MIXED HYPERLIPIDEMIA: ICD-10-CM

## 2024-12-12 DIAGNOSIS — R80.9 TYPE 2 DIABETES MELLITUS WITH DIABETIC MICROALBUMINURIA, WITHOUT LONG-TERM CURRENT USE OF INSULIN (HCC): ICD-10-CM

## 2024-12-12 NOTE — TELEPHONE ENCOUNTER
Incoming (mail or fax):  fax  Received from:  [facility]  Documentation given to:  triage incoming    Medication does not need a review at this time

## 2024-12-12 NOTE — TELEPHONE ENCOUNTER
Incoming (mail or fax):  fax  Received from:  [facility]  Documentation given to:  Triage incoming    Metformin

## 2024-12-13 RX ORDER — METFORMIN HYDROCHLORIDE 500 MG/1
1000 TABLET, EXTENDED RELEASE ORAL
Qty: 180 TABLET | Refills: 0 | OUTPATIENT
Start: 2024-12-13

## 2024-12-13 RX ORDER — OMEGA-3-ACID ETHYL ESTERS 1 G/1
2 CAPSULE, LIQUID FILLED ORAL 2 TIMES DAILY
Qty: 180 CAPSULE | Refills: 0 | OUTPATIENT
Start: 2024-12-13

## 2024-12-13 RX ORDER — ROSUVASTATIN CALCIUM 10 MG/1
10 TABLET, COATED ORAL NIGHTLY
Qty: 90 TABLET | Refills: 0 | OUTPATIENT
Start: 2024-12-13

## 2024-12-13 NOTE — TELEPHONE ENCOUNTER
Below signed by provider already faxed by other RN. Noted. Rec fax auth not needed. Faxed message to [employer] auth not needed. Rec confirmation. Fax from [facility] sent for scanning. Message sent to patient.

## 2024-12-16 RX ORDER — METFORMIN HYDROCHLORIDE 500 MG/1
1000 TABLET, EXTENDED RELEASE ORAL
Qty: 180 TABLET | Refills: 0 | Status: SHIPPED | OUTPATIENT
Start: 2024-12-16 | End: 2025-01-07

## 2024-12-16 RX ORDER — ROSUVASTATIN CALCIUM 10 MG/1
10 TABLET, COATED ORAL DAILY
Qty: 90 TABLET | Refills: 0 | Status: SHIPPED | OUTPATIENT
Start: 2024-12-16 | End: 2025-01-07

## 2024-12-17 ENCOUNTER — APPOINTMENT (OUTPATIENT)
Dept: CARDIOLOGY | Age: 34
End: 2024-12-17

## 2025-01-07 ENCOUNTER — OFFICE VISIT (OUTPATIENT)
Dept: INTERNAL MEDICINE CLINIC | Facility: CLINIC | Age: 35
End: 2025-01-07
Payer: MEDICAID

## 2025-01-07 VITALS
HEIGHT: 68 IN | TEMPERATURE: 97 F | SYSTOLIC BLOOD PRESSURE: 110 MMHG | HEART RATE: 105 BPM | RESPIRATION RATE: 16 BRPM | BODY MASS INDEX: 18.09 KG/M2 | WEIGHT: 119.38 LBS | DIASTOLIC BLOOD PRESSURE: 70 MMHG | OXYGEN SATURATION: 97 %

## 2025-01-07 DIAGNOSIS — E78.00 HYPERCHOLESTEROLEMIA: ICD-10-CM

## 2025-01-07 DIAGNOSIS — M67.979 TIBIALIS POSTERIOR TENDINOPATHY: ICD-10-CM

## 2025-01-07 DIAGNOSIS — I47.11 INAPPROPRIATE SINUS TACHYCARDIA (HCC): ICD-10-CM

## 2025-01-07 DIAGNOSIS — E11.9 TYPE 2 DIABETES MELLITUS WITHOUT COMPLICATION, WITHOUT LONG-TERM CURRENT USE OF INSULIN (HCC): Primary | ICD-10-CM

## 2025-01-07 DIAGNOSIS — L20.84 INTRINSIC ECZEMA: ICD-10-CM

## 2025-01-07 PROCEDURE — 99214 OFFICE O/P EST MOD 30 MIN: CPT | Performed by: STUDENT IN AN ORGANIZED HEALTH CARE EDUCATION/TRAINING PROGRAM

## 2025-01-07 RX ORDER — TRIAMCINOLONE ACETONIDE 1 MG/G
1 OINTMENT TOPICAL 2 TIMES DAILY
COMMUNITY

## 2025-01-07 RX ORDER — FLUTICASONE PROPIONATE 50 MCG
2 SPRAY, SUSPENSION (ML) NASAL DAILY
Qty: 11.1 ML | Refills: 1 | Status: SHIPPED | OUTPATIENT
Start: 2025-01-07 | End: 2026-01-02

## 2025-01-07 RX ORDER — ROSUVASTATIN CALCIUM 10 MG/1
10 TABLET, COATED ORAL DAILY
Qty: 90 TABLET | Refills: 0 | Status: SHIPPED | OUTPATIENT
Start: 2025-01-07

## 2025-01-07 RX ORDER — FLUOCINOLONE ACETONIDE 0.11 MG/ML
1 OIL TOPICAL DAILY
COMMUNITY
Start: 2024-12-05

## 2025-01-07 RX ORDER — BISOPROLOL FUMARATE 5 MG/1
2.5 TABLET, FILM COATED ORAL DAILY
Qty: 60 TABLET | Refills: 1 | Status: SHIPPED | OUTPATIENT
Start: 2025-01-07

## 2025-01-07 NOTE — PROGRESS NOTES
OFFICE NOTE     Patient ID: Jamal Irving is a 34 year old male.  Today's Date: 01/07/25  Chief Complaint: Medication Follow-Up and Foot Pain (Going on for 2 weeks, Right Inner Foot/Heel area, painful and feels like there's a \"pulling\" inside the area when walking)    PMH of ADHD, depression anxiety, type 2 diabetes, HLD, tachycardia, dry skin dermatitis and eczema of bilateral lower extremities, who presents to the office today for the evaluation of elevated heart rate and palpitations.    Recent Holter 12/11/2024 :INTERPRETATION: Total analysis time was 7 days and 0 hours. Underlying rhythm was sinus rhythm/sinus tachycardia with an average heart rate of 100 beats per minute, minimum 55 beats per minute, and maximum of 176 beats per minute. There were 3 patient diary events with symptoms listed as lightheaded, racing, and lightheaded again. All events were associated with sinus tachycardia. PAC and PVC burdens were less than 1%. No atrial fibrillation, ventricular arrhythmias or pauses were seen.     Also states that he recently went to [facility] and went to see a physician over there and his medications were switched around. Metformin was discontinued as patient had bloating with it. Increased Jardiance to 25 mg daily and was started on Linagliptin 5 mg daily. Bloating is jenny on this regiment.  Checks his BS in am:  125-128 range    His another concern today is the pain in the right foot , which has been there for about 2 weeks time and is bothering him, worsening when he walks, getting better with rest . Describes as pulling sensation.    Was recently seen by dermatology, diagnosed with eczema/atopic dermatitis, and skin biopsy showed early lichen simplex chronicus. started on Dupixent , first injection 12/20/2024. States it will take 2 months to see the full effect.    Vitals:    01/07/25 1321   BP: 110/70   Pulse: 105   Resp: 16   Temp: 97.2 °F (36.2 °C)   TempSrc: Temporal   SpO2: 97%    Weight: 119 lb 6.4 oz (54.2 kg)   Height: 5' 8\" (1.727 m)     body mass index is 18.15 kg/m².  BP Readings from Last 3 Encounters:   01/07/25 110/70   12/03/24 98/68   11/15/24 108/62     The ASCVD Risk score (Kimani DK, et al., 2019) failed to calculate for the following reasons:    The 2019 ASCVD risk score is only valid for ages 40 to 79      Medications reviewed:  Current Outpatient Medications   Medication Sig Dispense Refill    triamcinolone 0.1 % External Ointment Apply 1 Application topically 2 (two) times daily.      Dupilumab 300 MG/2ML Subcutaneous Solution Auto-injector Inject 300 mg into the skin every 14 (fourteen) days.      Fluocinolone Acetonide Scalp 0.01 % External Oil Apply 1 Application topically daily.      bisoprolol 5 MG Oral Tab Take 0.5 tablets (2.5 mg total) by mouth daily. 60 tablet 1    empagliflozin (JARDIANCE) 25 MG Oral Tab Take 1 tablet (25 mg total) by mouth daily. 90 tablet 0    linaGLIPtin 5 mg Oral Tab Take 1 tablet (5 mg total) by mouth daily. 90 tablet 0    rosuvastatin 10 MG Oral Tab Take 1 tablet (10 mg total) by mouth daily. 90 tablet 0    fluticasone propionate 50 MCG/ACT Nasal Suspension 2 sprays by Each Nare route daily. 11.1 mL 1    GUANFACINE HCL ER 3 MG Oral Tablet 24 Hr Take 1 tablet by mouth daily. 30 tablet 0    Blood Glucose Monitoring Suppl (ONETOUCH VERIO REFLECT) w/Device Does not apply Kit       OneTouch Delica Lancets 33G Does not apply Misc       Amphetamine-Dextroamphet ER 20 MG Oral Capsule SR 24 Hr Take 1 capsule (20 mg total) by mouth every morning.      Glucose Blood (ONETOUCH TEST VI)       omega-3-acid ethyl esters 1 g Oral Cap Take 2 capsules (2 g total) by mouth 2 (two) times daily. 180 capsule 0    lisdexamfetamine (VYVANSE) 50 MG Oral Cap Take 1 capsule (50 mg total) by mouth daily. 30 capsule 0    clobetasol 0.05 % External Ointment Apply topically 2 (two) times daily.      amphetamine-dextroamphetamine 10 MG Oral Tab Take by mouth as needed.       busPIRone 10 MG Oral Tab Take 1 tablet (10 mg total) by mouth daily. 30 tablet 0    FETZIMA 120 MG Oral Capsule SR 24 Hr Take 120 mg by mouth daily. 30 capsule 0         Assessment & Plan    1. Type 2 diabetes mellitus without complication, without long-term current use of insulin (HCC) (Primary)  Patient feels well on the current regiment. Will continue with that and will check HbA1c in 3 months.  -     Empagliflozin; Take 1 tablet (25 mg total) by mouth daily.  Dispense: 90 tablet; Refill: 0  -     linaGLIPtin; Take 1 tablet (5 mg total) by mouth daily.  Dispense: 90 tablet; Refill: 0  -     Hemoglobin A1C; Future; Expected date: 04/07/2025    2. Hypercholesterolemia  -     Rosuvastatin Calcium; Take 1 tablet (10 mg total) by mouth daily.  Dispense: 90 tablet; Refill: 0    3. Tibialis posterior tendinopathy  Symptoms and physical exam consistent with Posterior tibialis tonsillopathy  Will benefit from PT and RICE therapy.    -     Physical Therapy Referral - [facility]    4. Inappropriate sinus tachycardia (HCC)  Average rate 100 beats per minute. Patient has symptomatic when his HR goes up. I would like to start him on low dose bisoprolol and slowly titrate to 5 mg daily if tolerated well. He previously tried Propranolol, but it caused fatigue.   -     Bisoprolol Fumarate; Take 0.5 tablets (2.5 mg total) by mouth daily.  Dispense: 60 tablet; Refill: 1    5. Intrinsic eczema  Continue to follow with dermatology    Other orders  -     Fluticasone Propionate; 2 sprays by Each Nare route daily.  Dispense: 11.1 mL; Refill: 1      Follow Up: As needed/if symptoms worsen or Return in about 3 months (around 4/7/2025) for Diabetes follow up..     I spent 35 minutes obtaining pertitent medical history, reviewing pertinent imaging/labs and specialists notes, evaluating patient, discussing differential diagnosis' and various treatment options, reinforcing importance of compliance with treatment plan, and completing  documentation.        There is a longitudinal care relationship with me, the care plan reflects the ongoing nature of the continuous relationship of care, and the medical record indicates that there is ongoing treatment of a serious/complex medical condition which I am currently managing.  is Applicable.     Objective/ Results:   Physical Exam  Constitutional:       Appearance: Normal appearance. He is normal weight.   Eyes:      Extraocular Movements: Extraocular movements intact.      Conjunctiva/sclera: Conjunctivae normal.      Pupils: Pupils are equal, round, and reactive to light.   Cardiovascular:      Rate and Rhythm: Regular rhythm. Tachycardia present.      Pulses: Normal pulses.      Heart sounds: Normal heart sounds. No murmur heard.     No friction rub.   Pulmonary:      Effort: Pulmonary effort is normal. No respiratory distress.      Breath sounds: Normal breath sounds. No stridor. No wheezing or rhonchi.   Musculoskeletal:      Cervical back: Normal range of motion. No rigidity.      Right lower leg: No edema.      Left lower leg: No edema.      Right ankle: No swelling, deformity or ecchymosis. Tenderness (posterior to medial maleolus) present. Normal range of motion.      Right Achilles Tendon: Normal.      Left ankle: No swelling, deformity or ecchymosis. Normal range of motion.      Left Achilles Tendon: Normal.        Feet:    Lymphadenopathy:      Cervical: No cervical adenopathy.   Skin:     General: Skin is warm and dry.      Capillary Refill: Capillary refill takes less than 2 seconds.      Comments: Skin is very dry   Neurological:      General: No focal deficit present.      Mental Status: He is alert and oriented to person, place, and time.   Psychiatric:         Mood and Affect: Mood normal.         Behavior: Behavior normal.         Thought Content: Thought content normal.         Judgment: Judgment normal.        Reviewed:    Patient Active Problem List    Diagnosis     Inappropriate sinus tachycardia (HCC)    Rapid palpitations    Lightheadedness    Dry skin dermatitis    Eye pressure    Tachycardia    Mixed hyperlipidemia    Type 2 diabetes mellitus without complication, without long-term current use of insulin (HCC)    Hair thinning    Intrinsic eczema    Anxiety disorder    Depression    Attention deficit hyperactivity disorder (ADHD)      Allergies[1]     Social History     Socioeconomic History    Marital status: Single   Tobacco Use    Smoking status: Never     Passive exposure: Never    Smokeless tobacco: Never   Vaping Use    Vaping status: Never Used   Substance and Sexual Activity    Alcohol use: Not Currently     Comment: quit 6 years ago    Drug use: Not Currently   Other Topics Concern    Caffeine Concern No    Exercise Yes    Seat Belt No    Special Diet No    Stress Concern Yes    Weight Concern No      Review of Systems   Constitutional: Negative.    HENT: Negative.     Eyes: Negative.    Respiratory: Negative.     Cardiovascular: Negative.    Gastrointestinal: Negative.    Endocrine: Negative.    Genitourinary: Negative.    Musculoskeletal:         Positive for pain in th right medial ankle    Skin: Negative.    Neurological: Negative.    Hematological: Negative.      All other systems negative unless otherwise stated in ROS or HPI above.       Irina Nowak MD  Internal Medicine       Call office with any questions or seek emergency care if necessary.   Patient understands and agrees to follow directions and advice.      ----------------------------------------- PATIENT INSTRUCTIONS-----------------------------------------     There are no Patient Instructions on file for this visit.        The 21st Century Cures Act makes medical notes available to patients in the interest of transparency.  However, please be advised that this is a medical document.  It is intended as a peer to peer communication.  It is written in medical language and may contain abbreviations  or verbiage that are technical and unfamiliar.  It may appear blunt or direct.  Medical documents are intended to carry relevant information, facts as evident, and the clinical opinion of the practitioner.          [1]   Allergies  Allergen Reactions    Bupropion ANXIETY and Tightness in Chest    Clonidine CONFUSION, DIZZINESS and FATIGUE    Omeprazole NAUSEA AND VOMITING    Propranolol DIZZINESS     Disorientation    Atomoxetine OTHER (SEE COMMENTS)    Carbamazepine OTHER (SEE COMMENTS)    Citalopram OTHER (SEE COMMENTS)     Not effective    Desipramine OTHER (SEE COMMENTS)     Excessive sleepiness    Methylphenidate OTHER (SEE COMMENTS)    Oxcarbazepine OTHER (SEE COMMENTS)    Thiothixene OTHER (SEE COMMENTS)    Vilazodone OTHER (SEE COMMENTS)    Escitalopram FATIGUE and INSOMNIA    Fluoxetine FATIGUE    Paroxetine FATIGUE    Sertraline FATIGUE    Venlafaxine FATIGUE

## 2025-02-12 ENCOUNTER — E-ADVICE (OUTPATIENT)
Dept: CARDIOLOGY | Age: 35
End: 2025-02-12

## 2025-02-14 RX ORDER — BLOOD SUGAR DIAGNOSTIC
1 STRIP MISCELLANEOUS AS DIRECTED
Qty: 100 STRIP | Refills: 0 | Status: SHIPPED | OUTPATIENT
Start: 2025-02-14

## 2025-02-14 RX ORDER — LANCETS 33 GAUGE
1 EACH MISCELLANEOUS DAILY
Qty: 100 EACH | Refills: 0 | Status: SHIPPED | OUTPATIENT
Start: 2025-02-14

## 2025-03-11 DIAGNOSIS — E78.2 MIXED HYPERLIPIDEMIA: ICD-10-CM

## 2025-03-11 RX ORDER — OMEGA-3-ACID ETHYL ESTERS 1 G/1
2 CAPSULE, LIQUID FILLED ORAL 2 TIMES DAILY
Qty: 360 CAPSULE | Refills: 0 | Status: SHIPPED | OUTPATIENT
Start: 2025-03-11

## 2025-03-11 NOTE — TELEPHONE ENCOUNTER
Cholesterol Medication Protocol [id number]/11/2025 09:06 AM   Protocol Details ALT < 80    ALT resulted within past year    Lipid panel within past 12 months    In person appointment or virtual visit in the past 12 mos or appointment in next 3 mos    Medication is active on med list   . Hypercholesterolemia  -     Rosuvastatin Calcium; Take 1 tablet (10 mg total) by mouth daily.  Dispense: 90 tablet; Refill: 0  Future Appointments   Date Time Provider Department Center   5/19/2025  1:00 PM Irina Nowak MD [facility]

## 2025-03-13 NOTE — TELEPHONE ENCOUNTER
Incoming (mail or fax):  fax  Received from:  [employer]  Documentation given to:  Triage incoming    PA - Omega 3 acid ethyl esters

## 2025-03-14 ENCOUNTER — TELEPHONE (OUTPATIENT)
Dept: INTERNAL MEDICINE CLINIC | Facility: CLINIC | Age: 35
End: 2025-03-14

## 2025-03-14 NOTE — TELEPHONE ENCOUNTER
Indiana Hanson       3/13/25  3:21 PM  Note     Incoming (mail or fax):  fax  Received from:  [employer]  Documentation given to:  Triage incoming     PA - Omega 3 acid ethyl esters      Pa initiated and all questions answered. Awaiting insurance decision.

## 2025-03-15 NOTE — TELEPHONE ENCOUNTER
Incoming (mail or fax):  fax  Received from:  [facility]  Documentation given to:  Triage incoming    Coverage denied

## 2025-04-03 DIAGNOSIS — E11.9 TYPE 2 DIABETES MELLITUS WITHOUT COMPLICATION, WITHOUT LONG-TERM CURRENT USE OF INSULIN (HCC): ICD-10-CM

## 2025-04-03 RX ORDER — EMPAGLIFLOZIN 25 MG/1
25 TABLET, FILM COATED ORAL DAILY
Qty: 90 TABLET | Refills: 0 | Status: SHIPPED | OUTPATIENT
Start: 2025-04-03

## 2025-04-03 RX ORDER — LINAGLIPTIN 5 MG/1
5 TABLET, FILM COATED ORAL DAILY
Qty: 90 TABLET | Refills: 0 | Status: SHIPPED | OUTPATIENT
Start: 2025-04-03

## 2025-04-03 NOTE — TELEPHONE ENCOUNTER
Last office visit: 1/7/2025   Protocol: pass  Requested medication(s) are due for refill today: yes  Requested medication(s) are on the active medication list same strength, form, dose/ sig: yes  Requested medication(s) are managed by provider: yes  Patient has already received a courtsey refill: no    NOV: 5/19/2025  Last Labs: 11/15/2024  Asked to Return: 4/7/2025

## 2025-04-08 ENCOUNTER — LAB ENCOUNTER (OUTPATIENT)
Dept: LAB | Age: 35
End: 2025-04-08
Attending: STUDENT IN AN ORGANIZED HEALTH CARE EDUCATION/TRAINING PROGRAM
Payer: MEDICAID

## 2025-04-08 DIAGNOSIS — R80.9 TYPE 2 DIABETES MELLITUS WITH DIABETIC MICROALBUMINURIA, WITHOUT LONG-TERM CURRENT USE OF INSULIN (HCC): ICD-10-CM

## 2025-04-08 DIAGNOSIS — E11.29 TYPE 2 DIABETES MELLITUS WITH DIABETIC MICROALBUMINURIA, WITHOUT LONG-TERM CURRENT USE OF INSULIN (HCC): ICD-10-CM

## 2025-04-08 LAB
CREAT UR-SCNC: 107.5 MG/DL
EST. AVERAGE GLUCOSE BLD GHB EST-MCNC: 123 MG/DL (ref 68–126)
HBA1C MFR BLD: 5.9 % (ref ?–5.7)
MICROALBUMIN UR-MCNC: 0.4 MG/DL
MICROALBUMIN/CREAT 24H UR-RTO: 3.7 UG/MG (ref ?–30)

## 2025-04-08 PROCEDURE — 36415 COLL VENOUS BLD VENIPUNCTURE: CPT

## 2025-04-08 PROCEDURE — 82570 ASSAY OF URINE CREATININE: CPT

## 2025-04-08 PROCEDURE — 82043 UR ALBUMIN QUANTITATIVE: CPT

## 2025-04-08 PROCEDURE — 83036 HEMOGLOBIN GLYCOSYLATED A1C: CPT

## 2025-04-10 RX ORDER — FLUTICASONE PROPIONATE 50 MCG
2 SPRAY, SUSPENSION (ML) NASAL DAILY
Qty: 11.1 ML | Refills: 1 | Status: SHIPPED | OUTPATIENT
Start: 2025-04-10 | End: 2026-04-05

## 2025-04-10 NOTE — TELEPHONE ENCOUNTER
Allergy Medication Protocol [id number]/10/2025 01:45 PM   Protocol Details In person appointment or virtual visit in the past 12 mos or appointment in next 3 mos    Medication is active on med list

## 2025-04-15 RX ORDER — BUSPIRONE HYDROCHLORIDE 15 MG/1
TABLET ORAL
COMMUNITY

## 2025-04-15 RX ORDER — GUANFACINE 1 MG/1
TABLET, EXTENDED RELEASE ORAL
COMMUNITY
Start: 2025-02-03 | End: 2025-04-16

## 2025-04-15 RX ORDER — AZELASTINE HYDROCHLORIDE 137 UG/1
SPRAY, METERED NASAL
COMMUNITY
Start: 2025-01-21

## 2025-04-15 RX ORDER — OLOPATADINE HYDROCHLORIDE 665 UG/1
2 SPRAY NASAL 2 TIMES DAILY
COMMUNITY
Start: 2025-02-06 | End: 2025-04-16

## 2025-04-15 RX ORDER — FEXOFENADINE HCL 180 MG/1
TABLET ORAL
COMMUNITY

## 2025-04-16 ENCOUNTER — LAB ENCOUNTER (OUTPATIENT)
Dept: LAB | Facility: HOSPITAL | Age: 35
End: 2025-04-16
Attending: STUDENT IN AN ORGANIZED HEALTH CARE EDUCATION/TRAINING PROGRAM
Payer: MEDICAID

## 2025-04-16 ENCOUNTER — OFFICE VISIT (OUTPATIENT)
Dept: INTERNAL MEDICINE CLINIC | Facility: CLINIC | Age: 35
End: 2025-04-16
Payer: MEDICAID

## 2025-04-16 VITALS
BODY MASS INDEX: 18.64 KG/M2 | TEMPERATURE: 98 F | DIASTOLIC BLOOD PRESSURE: 68 MMHG | OXYGEN SATURATION: 99 % | SYSTOLIC BLOOD PRESSURE: 106 MMHG | RESPIRATION RATE: 14 BRPM | WEIGHT: 123 LBS | HEART RATE: 91 BPM | HEIGHT: 68 IN

## 2025-04-16 DIAGNOSIS — L85.3 DRY SKIN DERMATITIS: ICD-10-CM

## 2025-04-16 DIAGNOSIS — E78.2 MIXED HYPERLIPIDEMIA: ICD-10-CM

## 2025-04-16 DIAGNOSIS — R35.0 FREQUENCY OF URINATION: ICD-10-CM

## 2025-04-16 DIAGNOSIS — R79.89 ELEVATED TSH: ICD-10-CM

## 2025-04-16 DIAGNOSIS — L20.84 INTRINSIC ECZEMA: ICD-10-CM

## 2025-04-16 DIAGNOSIS — I47.11 INAPPROPRIATE SINUS TACHYCARDIA (HCC): ICD-10-CM

## 2025-04-16 DIAGNOSIS — F90.9 ATTENTION DEFICIT HYPERACTIVITY DISORDER (ADHD), UNSPECIFIED ADHD TYPE: ICD-10-CM

## 2025-04-16 DIAGNOSIS — F32.A DEPRESSION, UNSPECIFIED DEPRESSION TYPE: ICD-10-CM

## 2025-04-16 DIAGNOSIS — E11.9 TYPE 2 DIABETES MELLITUS WITHOUT COMPLICATION, WITHOUT LONG-TERM CURRENT USE OF INSULIN (HCC): Primary | ICD-10-CM

## 2025-04-16 DIAGNOSIS — R14.3 FLATULENCE: ICD-10-CM

## 2025-04-16 DIAGNOSIS — J30.89 ALLERGY TO DUST: ICD-10-CM

## 2025-04-16 DIAGNOSIS — H00.015 HORDEOLUM EXTERNUM OF LEFT LOWER EYELID: ICD-10-CM

## 2025-04-16 DIAGNOSIS — Z91.09 MITE ALLERGY: ICD-10-CM

## 2025-04-16 DIAGNOSIS — R00.0 TACHYCARDIA: ICD-10-CM

## 2025-04-16 LAB
BILIRUB UR QL STRIP.AUTO: NEGATIVE
CLARITY UR REFRACT.AUTO: CLEAR
GLUCOSE UR STRIP.AUTO-MCNC: >1000 MG/DL
KETONES UR STRIP.AUTO-MCNC: NEGATIVE MG/DL
LEUKOCYTE ESTERASE UR QL STRIP.AUTO: NEGATIVE
NITRITE UR QL STRIP.AUTO: NEGATIVE
PH UR STRIP.AUTO: 6 [PH] (ref 5–8)
PROT UR STRIP.AUTO-MCNC: NEGATIVE MG/DL
RBC UR QL AUTO: NEGATIVE
SP GR UR STRIP.AUTO: >1.03 (ref 1–1.03)
T3 SERPL-MCNC: 0.88 NG/ML (ref 0.6–1.81)
T4 FREE SERPL-MCNC: 1.4 NG/DL (ref 0.8–1.7)
TSI SER-ACNC: 2.47 UIU/ML (ref 0.55–4.78)
UROBILINOGEN UR STRIP.AUTO-MCNC: NORMAL MG/DL

## 2025-04-16 PROCEDURE — 84439 ASSAY OF FREE THYROXINE: CPT

## 2025-04-16 PROCEDURE — 84443 ASSAY THYROID STIM HORMONE: CPT

## 2025-04-16 PROCEDURE — 84480 ASSAY TRIIODOTHYRONINE (T3): CPT

## 2025-04-16 PROCEDURE — 36415 COLL VENOUS BLD VENIPUNCTURE: CPT

## 2025-04-16 PROCEDURE — 81003 URINALYSIS AUTO W/O SCOPE: CPT

## 2025-04-16 RX ORDER — DULAGLUTIDE 0.75 MG/.5ML
0.5 INJECTION, SOLUTION SUBCUTANEOUS WEEKLY
Qty: 2 ML | Refills: 0 | Status: SHIPPED | OUTPATIENT
Start: 2025-04-16

## 2025-04-16 RX ORDER — ERYTHROMYCIN 5 MG/G
1 OINTMENT OPHTHALMIC NIGHTLY
Qty: 1 G | Refills: 0 | Status: SHIPPED | OUTPATIENT
Start: 2025-04-16

## 2025-04-16 NOTE — PATIENT INSTRUCTIONS
INSTRUCTIONS:  1. Switch from Tradjenta to Trulicity and continue taking other medications  2. Continue taking rosuvastatin and retest your cholesterol and ApoB levels next month.   3. Continue taking bisoprolol 5 mg.  4. Continue using Flonase, azelastine, and Allegra for your dust mite allergy.  5. Continue using Dupixent for your eczema.  6. Recheck TSH, T4, and T3 levels.  7. Use erythromycin ointment and apply warm compresses for your eye swelling.  8. Try a low FODMAP diet and consider referral to a gastroenterologist for SIBO testing if symptoms persist.  9. Check your urine for infection and consider referral to a urologist if urinary urgency symptoms persist.    Contains text generated by Camila

## 2025-04-16 NOTE — PROGRESS NOTES
OFFICE NOTE       The following individual(s) verbally consented to be recorded using ambient AI listening technology and understand that they can each withdraw their consent to this listening technology at any point by asking the clinician to turn off or pause the recording:    Patient name: Jamal Irving          Patient ID: Jamal Irving is a 34 year old male.  Today's Date: 04/16/25  Chief Complaint: Follow - Up (Discuss glucose meds and heart rate issues. Pt has had flatulence issues, bump below L eye that he noticed 3wks ago.)    PMH of ADHD, depression anxiety, type 2 diabetes, HLD, tachycardia, dry skin dermatitis and eczema of bilateral lower extremities, who presents to the office today for the evaluation of elevated heart rate and palpitations.   History of Present Illness  Jamal Irving is a 34-year-old male who presents for follow-up on his chronic conditions and medication management.    He is experiencing stress due to his father's recent health issues, including a triple bypass surgery after discovering three major blockages in his coronary arteries. His father recently migrated to the [place] and experienced chest pressure shortly after arrival, leading to the surgery. He has been actively involved in his father's care.    # T2 DM    Metformin caused more Bloating  Recent hemoglobin A1c of 5.9. He is currently taking Jardiance and Tradjenta, and checks his blood sugar every two days, noting it has been stable. He aims to lower his A1c below 5.7. He wants to switch from Tradjenta to Trulicity for better glycemic control.  Eye exam: 12/19/2024 - No  - Sean Askew MD   Foot exam: Bilateral barefoot skin diabetic exam is abnormal with dry skin, however improved from prior.  Bilateral monofilament/sensation of both feet is normal. Vibration sensation is normal.  Pulsation pedal pulse exam of both lower legs/feet is normal as well.    # HLD   Did not tolerate atorvastatin in the past  Currently  taking Rosuvastatin 10 mg daily, tolerating well.   His LDL-C is currently 57, with a goal of 55 due to family history of high cholesterol. His ApoB is 74, and he plans to retest next month. His sister also has high cholesterol and is on rosuvastatin.       # Inappropriate sinus tachycardia  Elevated heart rate since 2020s  EKG on 10/29/2024 showe sinus tachycardia.   Was started 1/2025 on Bisoprolol, currently taking 5 mg daily  Tolerating well, no dizziness, no Lightheadedness.   His heart rate remains in the 90s while on bisoprolol 5 mg, which has helped maintain it below 100.  Recent Holter monitor and echocardiogram, both showing normal results. The Holter monitor indicated an avg heart rate of 90, with no significant arrhythmias. The echocardiogram showed a normal left ventricle with an ejection fraction of 56%.    # Dust and Mite allergy   On a regimen of Flonase, azelastine, and Allegra 180 mg daily. He reports improvement in symptoms with this treatment.    # ADHD   # Depression and Anxiety  On treatment since 2009  Currently follows with Psychiatry, Hernando Apple  Takes Adderal XR (20 mg ) in am and then Vivance 50 mg .in the afternoon. Takes PRN Adderal 10 mg 1-2 times a week   Taking Fetzima 120 mg   He recently stopped guanfacine after consulting with his psychiatrist.       # Eczema/atopic dermatitis, and skin biopsy showed early lichen simplex chronicus. started on Dupixent , first injection 12/20/2024, which has improved his skin condition. He reports less redness and flakiness.    # Urinary urgency, which was evaluated last year without any findings. The symptoms have returned about a month ago, with a strong urge to urinate quickly once the sensation arises.    # Low TSH  Recent TSH level of 5.4, with normal T4 levels. He experiences fatigue but no other symptoms of hypothyroidism. His mother is on levothyroxine.    # Flatulence  He experiences significant gas, which worsened with metformin. He has  not tried a low FODMAP diet but is considering it, as his mother has similar symptoms and is on this diet.       Vitals:    04/16/25 1248   BP: 106/68   Pulse: 91   Resp: 14   Temp: 98 °F (36.7 °C)   TempSrc: Temporal   SpO2: 99%   Weight: 123 lb (55.8 kg)   Height: 5' 8\" (1.727 m)     body mass index is 18.7 kg/m².  BP Readings from Last 3 Encounters:   04/16/25 106/68   01/07/25 110/70   12/03/24 98/68     The ASCVD Risk score (Kimani HDZ, et al., 2019) failed to calculate for the following reasons:    The 2019 ASCVD risk score is only valid for ages 40 to 79  Results  LABS  HbA1c: 5.9%  Microalbumin/Creatinine Ratio: normal  TSH: 5.4  T4: 0.99  LDL-C: 57  Apolipoprotein B: 74  Lipoprotein A: 20    DIAGNOSTIC  Holter Monitor: Predominant sinus rhythm, HR 90, min 55, max 156, no SVT, VT, pauses, advanced heart block, or AFib (03/25/2025)  Echocardiogram: Normal left ventricle, no hypertrophy, normal systolic function, EF 56%, normal diastolic function, normal left atrial pressure (03/25/2025)       Medications reviewed:  Current Medications[1]      Assessment & Plan  Type 2 Diabetes Mellitus  Hemoglobin A1c is 5.9, Currently on Jardiance and Tradjenta but wishes to switch from Tradjenta to Trulicity due to its cardiovascular benefits and for better glycemic control. Actively monitoring blood glucose levels and motivated to reduce A1c below 5.7. Microalbumin creatinine ratio has improved, likely due to Jardiance.   - Switch from Tradjenta to Trulicity 0.75 mg weekly  - Continue Jardiance  - Continue monitoring blood glucose levels every two days    Hyperlipidemia  LDL-C is currently 57, with a goal of less than 55 due to family history of cardiovascular disease. On rosuvastatin and may have ezetimibe added if LDL-C remains above 55 by his Crdiologist. ApoB is 74, and the cardiologist is using this as a primary target for treatment. The cardiologist plans to retest cholesterol and ApoB next month and adjust treatment  based on results.  - Continue rosuvastatin  - Retest cholesterol and ApoB next month    Inappropriate sinus tachycardia  Currently on bisoprolol, which has helped maintain heart rate in the 90s, below the target of 100 at rest. The heart rate was previously over 100, and bisoprolol has been effective in reducing it.  - Continue bisoprolol 5 mg    Dust Mite Allergy  Diagnosed with a dust mite allergy and is on a regimen of Flonase, azelastine, and Allegra. This treatment plan is aimed at managing symptoms effectively.  - Continue Flonase, azelastine, and Allegra    Eczema  On Dupixent, which has improved skin condition, reducing redness and flakiness over time. The dermatologist indicated that improvements with Dupixent can be gradual, with effects seen even six months into treatment.  - Continue Dupixent    Subclinical Hypothyroidism  TSH was previously elevated at 5.4, but T4 and T3 levels are normal, indicating subclinical hypothyroidism. Does not exhibit significant symptoms of hypothyroidism. Monitoring is advised unless symptoms develop or TSH levels increase significantly.  - Recheck TSH, T4, and T3 levels    Eye Swelling  Swelling in the eye for about a month, which has reduced but not resolved. Likely bacterial in nature. Erythromycin ointment and warm compresses are recommended for treatment.  - Prescribe erythromycin ointment  - Apply warm compresses    Gastrointestinal Gas  Experiences significant gas, which was exacerbated by metformin. Considering a low FODMAP diet, which his mother follows for similar symptoms. Referral to a gastroenterologist for SIBO testing is an option if symptoms persist.  - Provide a list of low FODMAP foods  - Consider referral to gastroenterologist for SIBO testing if symptoms persist    Urinary Urgency  Experiences urinary urgency, which was previously evaluated without findings. Symptoms have returned over the past month. A referral to a urologist is considered if symptoms  persist.  - Check urine for infection  - Consider referral to urologist if symptoms persist       Follow Up: As needed/if symptoms worsen or No follow-ups on file..     I spent 45 minutes obtaining pertitent medical history, reviewing pertinent imaging/labs and specialists notes, evaluating patient, discussing differential diagnosis' and various treatment options, reinforcing importance of compliance with treatment plan, and completing documentation.        There is a longitudinal care relationship with me, the care plan reflects the ongoing nature of the continuous relationship of care, and the medical record indicates that there is ongoing treatment of a serious/complex medical condition which I am currently managing.  is Applicable.     Objective/ Results:   Physical Exam  Vitals reviewed.   Constitutional:       General: He is not in acute distress.     Appearance: Normal appearance. He is not ill-appearing.   HENT:      Head: Normocephalic and atraumatic.   Eyes:      Extraocular Movements: Extraocular movements intact.      Conjunctiva/sclera: Conjunctivae normal.      Pupils: Pupils are equal, round, and reactive to light.     Cardiovascular:      Rate and Rhythm: Normal rate and regular rhythm.      Heart sounds: No murmur heard.     No gallop.   Pulmonary:      Effort: Pulmonary effort is normal. No respiratory distress.      Breath sounds: Normal breath sounds. No stridor. No wheezing, rhonchi or rales.   Musculoskeletal:      Right lower leg: No edema.      Left lower leg: No edema.      Right foot: Normal range of motion. No bunion or Charcot foot.      Left foot: Normal range of motion. No bunion or Charcot foot.   Feet:      Right foot:      Skin integrity: Dry skin present. No ulcer, blister, skin breakdown, erythema, warmth or callus.      Toenail Condition: Right toenails are abnormally thick.      Left foot:      Skin integrity: Dry skin present. No ulcer, blister, skin breakdown, erythema,  warmth or callus.      Toenail Condition: Left toenails are abnormally thick.   Skin:     General: Skin is warm and dry.      Capillary Refill: Capillary refill takes less than 2 seconds.   Neurological:      General: No focal deficit present.      Mental Status: He is alert and oriented to person, place, and time.   Psychiatric:         Mood and Affect: Mood normal.         Behavior: Behavior normal.         Thought Content: Thought content normal.         Judgment: Judgment normal.        Physical Exam  NEUROLOGICAL: Sensation intact and symmetric. Vibration sense intact. Peripheral pulses intact.  SKIN: Skin improved, less erythema and flaking.     Reviewed:    Patient Active Problem List    Diagnosis    Inappropriate sinus tachycardia (HCC)    Rapid palpitations    Lightheadedness    Dry skin dermatitis    Eye pressure    Tachycardia    Mixed hyperlipidemia    Type 2 diabetes mellitus without complication, without long-term current use of insulin (HCC)    Hair thinning    Intrinsic eczema    Anxiety disorder    Depression    Attention deficit hyperactivity disorder (ADHD)      Allergies[2]   Short Social Hx on File[3]   Review of Systems   Constitutional: Negative.    HENT: Negative.     Eyes:         Positive for the lower lid lesion on the left eye   Respiratory: Negative.     Cardiovascular: Negative.    Gastrointestinal:  Negative for abdominal pain, blood in stool, constipation, diarrhea, nausea and vomiting.        Flatulence   Endocrine: Negative.    Genitourinary: Negative.    Musculoskeletal: Negative.    Neurological: Negative.        All other systems negative unless otherwise stated in ROS or HPI above.       Irina Nowak MD  Internal Medicine       Call office with any questions or seek emergency care if necessary.   Patient understands and agrees to follow directions and advice.      ----------------------------------------- PATIENT INSTRUCTIONS-----------------------------------------     There  are no Patient Instructions on file for this visit.        The 21st Century Cures Act makes medical notes available to patients in the interest of transparency.  However, please be advised that this is a medical document.  It is intended as a peer to peer communication.  It is written in medical language and may contain abbreviations or verbiage that are technical and unfamiliar.  It may appear blunt or direct.  Medical documents are intended to carry relevant information, facts as evident, and the clinical opinion of the practitioner.          [1]   Current Outpatient Medications   Medication Sig Dispense Refill    azelastine 137 MCG/SPRAY Nasal Solution       busPIRone 15 MG Oral Tab       Ferrous Bisglycinate Chelate 28 MG Oral Cap       fexofenadine 180 MG Oral Tab       fluticasone propionate 50 MCG/ACT Nasal Suspension 2 sprays by Each Nare route daily. 11.1 mL 1    JARDIANCE 25 MG Oral Tab Take 1 tablet (25 mg total) by mouth daily. 90 tablet 0    TRADJENTA 5 MG Oral Tab Take 1 tablet (5 mg total) by mouth daily. 90 tablet 0    omega-3-acid ethyl esters 1 g Oral Cap Take 2 capsules (2 g total) by mouth 2 (two) times daily. 360 capsule 0    Lancets (ONETOUCH DELICA PLUS [id number]) Does not apply Misc USE TO TEST BLOOD SUGAR ONCE DAILY 100 each 0    ONETOUCH VERIO In Vitro Strip USE TO TEST BLOOD SUGAR ONCE DAILY AS DIRECTED 100 strip 0    bisoprolol 5 MG Oral Tab Take 1 tablet (5 mg total) by mouth daily. 90 tablet 1    triamcinolone 0.1 % External Ointment Apply 1 Application topically 2 (two) times daily.      Dupilumab 300 MG/2ML Subcutaneous Solution Auto-injector Inject 300 mg into the skin every 14 (fourteen) days.      Fluocinolone Acetonide Scalp 0.01 % External Oil Apply 1 Application topically daily. (Patient taking differently: Apply 1 Application topically as needed.)      rosuvastatin 10 MG Oral Tab Take 1 tablet (10 mg total) by mouth daily. 90 tablet 0    Blood Glucose Monitoring Suppl (ONETOUCH  VERIO REFLECT) w/Device Does not apply Kit       Amphetamine-Dextroamphet ER 20 MG Oral Capsule SR 24 Hr Take 1 capsule (20 mg total) by mouth every morning.      Glucose Blood (ONETOUCH TEST VI)       lisdexamfetamine (VYVANSE) 50 MG Oral Cap Take 1 capsule (50 mg total) by mouth daily. 30 capsule 0    clobetasol 0.05 % External Ointment Apply topically 2 (two) times daily.      amphetamine-dextroamphetamine 10 MG Oral Tab Take by mouth as needed.      FETZIMA 120 MG Oral Capsule SR 24 Hr Take 120 mg by mouth daily. 30 capsule 0   [2]   Allergies  Allergen Reactions    Bupropion ANXIETY and Tightness in Chest    Clonidine CONFUSION, DIZZINESS and FATIGUE    Omeprazole NAUSEA AND VOMITING    Propranolol DIZZINESS     Disorientation    Atomoxetine OTHER (SEE COMMENTS)    Carbamazepine OTHER (SEE COMMENTS)    Citalopram OTHER (SEE COMMENTS)     Not effective    Desipramine OTHER (SEE COMMENTS)     Excessive sleepiness    Methylphenidate OTHER (SEE COMMENTS)    Oxcarbazepine OTHER (SEE COMMENTS)    Thiothixene OTHER (SEE COMMENTS)    Vilazodone OTHER (SEE COMMENTS)    Escitalopram FATIGUE and INSOMNIA    Fluoxetine FATIGUE    Paroxetine FATIGUE    Sertraline FATIGUE    Venlafaxine FATIGUE   [3]   Social History  Socioeconomic History    Marital status: Single   Tobacco Use    Smoking status: Never     Passive exposure: Never    Smokeless tobacco: Never   Vaping Use    Vaping status: Never Used   Substance and Sexual Activity    Alcohol use: Not Currently     Comment: quit 6 years ago    Drug use: Not Currently   Other Topics Concern    Caffeine Concern No    Exercise Yes    Seat Belt No    Special Diet No    Stress Concern Yes    Weight Concern No

## 2025-04-17 ENCOUNTER — TELEPHONE (OUTPATIENT)
Dept: INTERNAL MEDICINE CLINIC | Facility: CLINIC | Age: 35
End: 2025-04-17

## 2025-04-17 NOTE — TELEPHONE ENCOUNTER
Incoming (mail or fax):  fax  Received from:  [facility]  Documentation given to:  Triage incoming    PA - Trulicity

## 2025-04-18 NOTE — TELEPHONE ENCOUNTER
Trulicity is denied, they will only cover for diabetes with an A1c over 6.5 or a fasting sugar over 126. Unfortunately pt does not have either. In 's bin thanks!

## 2025-04-18 NOTE — TELEPHONE ENCOUNTER
Incoming (mail or fax):  fax  Received from:  [email or web address]   Documentation given to:  Triage in     Trulicity denied

## 2025-05-06 ENCOUNTER — PATIENT MESSAGE (OUTPATIENT)
Facility: LOCATION | Age: 35
End: 2025-05-06

## 2025-05-06 ENCOUNTER — OFFICE VISIT (OUTPATIENT)
Facility: LOCATION | Age: 35
End: 2025-05-06
Payer: MEDICAID

## 2025-05-06 VITALS — WEIGHT: 123 LBS | HEIGHT: 68 IN | BODY MASS INDEX: 18.64 KG/M2

## 2025-05-06 DIAGNOSIS — J34.2 DEVIATED NASAL SEPTUM: Primary | ICD-10-CM

## 2025-05-06 DIAGNOSIS — J34.3 HYPERTROPHY OF NASAL TURBINATES: ICD-10-CM

## 2025-05-06 DIAGNOSIS — J34.89 NASAL OBSTRUCTION: ICD-10-CM

## 2025-05-06 DIAGNOSIS — R09.81 NASAL CONGESTION: ICD-10-CM

## 2025-05-06 DIAGNOSIS — J30.1 ALLERGIC RHINITIS DUE TO POLLEN, UNSPECIFIED SEASONALITY: ICD-10-CM

## 2025-05-06 PROCEDURE — 99204 OFFICE O/P NEW MOD 45 MIN: CPT | Performed by: STUDENT IN AN ORGANIZED HEALTH CARE EDUCATION/TRAINING PROGRAM

## 2025-05-06 PROCEDURE — 31231 NASAL ENDOSCOPY DX: CPT | Performed by: STUDENT IN AN ORGANIZED HEALTH CARE EDUCATION/TRAINING PROGRAM

## 2025-05-06 RX ORDER — VIBEGRON 75 MG/1
75 TABLET, FILM COATED ORAL DAILY
COMMUNITY
Start: 2025-04-25

## 2025-05-06 RX ORDER — ARIPIPRAZOLE 2 MG/1
2 TABLET ORAL EVERY MORNING
COMMUNITY
Start: 2025-04-30

## 2025-05-07 DIAGNOSIS — J34.2 DEVIATED NASAL SEPTUM: Primary | ICD-10-CM

## 2025-05-07 DIAGNOSIS — J34.3 HYPERTROPHY OF NASAL TURBINATES: ICD-10-CM

## 2025-05-07 NOTE — PROGRESS NOTES
Patient scheduled for Septoplasty, Bilateral inferior turbinate submucosal resection on 6/19/25 at [facility].

## 2025-05-07 NOTE — PROGRESS NOTES
[facility]  OTOLARYNGOLOGY - HEAD & NECK SURGERY    5/6/2025     Reason for Consultation:     Chief Complaint   Patient presents with    New Patient    Nasal Congestion     Patient here for nasal congestion symptoms, difficulty breathing through nose.          History of Present Illness:     History of Present Illness  Jamal Irving is a 34 year old male with allergic rhinitis who presents with chronic nasal congestion.    He has experienced chronic nasal congestion for many years, predominantly on the left side, associated with a history of allergic rhinitis and a confirmed allergy to dust mites.    He has attempted various allergy medications, including fluticasone nasal spray and oral antihistamines, which have provided approximately 30-40% improvement in symptoms. Despite this, significant nasal congestion persists.    The nasal congestion occasionally disrupts his sleep. He has not undergone any previous nasal surgery.    Past Medical History  Past Medical History[1]    Past Surgical History  Past Surgical History[2]    Family History  Family History[3]    Social History  Pediatric History   Patient Parents    Denzel Irving (Father)     Other Topics Concern    Caffeine Concern No    Exercise Yes    Seat Belt No    Special Diet No    Stress Concern Yes    Weight Concern No   Social History Narrative    Not on file           Current Medications:  Current Medications[4]    Allergies  Allergies[5]    Review of Systems:   A comprehensive 10 point review of systems was completed.  Pertinent positives and negatives noted in the the HPI.    Physical Exam:   Height 5' 8\" (1.727 m), weight 123 lb (55.8 kg).    GENERAL: No acute distress, Comfortable appearing  FACE: HB 1/6, Normal Animation  HEAD: Normocephalic  EYES: EOMI, pupils equil  EARS: Bilateral Auricles Symmetric  NOSE: Nares patent bilaterally  ORAL CAVITY: Tongue mobile, Oropharynx clear, Floor of mouth clear, Posterior oropharynx normal  NECK: No palpable  lymphadenopathy, thyroid not palpable, nontender    PROCEDURE: BILATERAL RIGID NASAL ENDOSCOPY  Bilateral rigid nasal endoscopy (42551) was performed. Verbal consent was obtained from the patient to proceed with rigid nasal endoscopy.  A rigid 4mm 30 degree nasal endoscope was used to examine both nasal cavities. The inferior meatus, inferior turbinate, nasopharynx, middle meatus, middle turbinate, superior meatus, superior turbinate, and sphenoethmoidal recess were examined bilaterally and deemed to be normal, with any exceptions as noted below. At the completion of the procedure the endoscope was removed. The patient tolerated the procedure well. There were no complications.    Findings: The bilateral inferior turbinates were enlarged bilaterally. The Septum was deviated to the left caudally with deviation of the septum off of the nasal crest. The middle meatus was patent bilaterally. There were no obvious masses or polyps noted.      Results:     Laboratory Data:  Lab Results   Component Value Date    WBC 4.7 10/31/2024    HGB 14.8 10/31/2024    HCT 44.9 10/31/2024    .0 10/31/2024    CREATSERUM 1.04 10/31/2024    BUN 16 10/31/2024     10/31/2024    K 4.2 10/31/2024     10/31/2024    CO2 28.0 10/31/2024     (H) 10/31/2024    CA 9.8 10/31/2024    ALB 4.7 10/31/2024    ALKPHO 52 10/31/2024    TP 6.8 10/31/2024    AST 17 10/31/2024    ALT 18 10/31/2024    T4F 1.4 04/16/2025    TSH 2.471 04/16/2025         Imaging:  No results found.    Results         Impression:       ICD-10-CM    1. Deviated nasal septum  J34.2       2. Hypertrophy of nasal turbinates  J34.3       3. Nasal congestion  R09.81       4. Nasal obstruction  J34.89       5. Allergic rhinitis due to pollen, unspecified seasonality  J30.1            Recommendations:  The patient continues to have nasal congestion and obstruction despite medical therapy. The patient would likely benefit from septoplasty and bilateral inferior  turbinate submucosal resection. I have discussed with the risks and benefits of surgery as well as what to expect following surgery. I have additionally provided them a comprehensive handout regarding possible risks, and postoperative care following surgery. They will review the handout, and I have made myself available to answer any questions or concerns prior to surgery.     Thank you for allowing me to participate in the care of your patient.    Erik Real,    Otolaryngology/Rhinology, Sinus, and Endoscopic Skull Base Surgery  [address] [place] [id number]  Phone [phone]  Fax [phone]  5/6/2025  10:33 PM  5/6/2025     Abridge tool was used for dictation purposes only and the patient was not recorded at any point during the visit.          [1]   Past Medical History:   Allergic rhinitis    Anxiety    Depression    Diabetes (HCC)    Esophageal reflux    Hyperlipidemia   [2] History reviewed. No pertinent surgical history.  [3]   Family History  Problem Relation Age of Onset    Hyperlipidemia Mother     Depression Mother     Fibromyalgia Mother     Anemia Mother     Hypertension Mother     Lipids Mother     Musculo-skelatal Disorder Mother         Fibromyalgia    Obesity Mother         Accompanied with sleep apnea    Psychiatric Mother         ADHD, Obsessive Compulsive Disorder    Hyperlipidemia Father     Anxiety Father     Heart Disease Father     Diabetes Father     Bleeding Disorders Father         Immune Thrombocytopenia    Hypertension Father     Lipids Father     Psychiatric Father         Generalized Anxiety Disorder    Depression Sister    [4]   Current Outpatient Medications   Medication Sig Dispense Refill    ARIPiprazole 2 MG Oral Tab Take 1 tablet (2 mg total) by mouth every morning.      Vibegron (GEMTESA) 75 MG Oral Tab Take 75 mg by mouth daily.      erythromycin 5 MG/GM Ophthalmic Ointment Place 1 Application into the left eye  nightly. 1 g 0    Dulaglutide (TRULICITY) 0.75 MG/0.5ML Subcutaneous Solution Auto-injector Inject 0.5 mL into the skin once a week. 2 mL 0    azelastine 137 MCG/SPRAY Nasal Solution       busPIRone 15 MG Oral Tab       Ferrous Bisglycinate Chelate 28 MG Oral Cap       fexofenadine 180 MG Oral Tab       fluticasone propionate 50 MCG/ACT Nasal Suspension 2 sprays by Each Nare route daily. 11.1 mL 1    JARDIANCE 25 MG Oral Tab Take 1 tablet (25 mg total) by mouth daily. 90 tablet 0    omega-3-acid ethyl esters 1 g Oral Cap Take 2 capsules (2 g total) by mouth 2 (two) times daily. 360 capsule 0    Lancets (ONETOUCH DELICA PLUS [id number]) Does not apply Misc USE TO TEST BLOOD SUGAR ONCE DAILY 100 each 0    ONETOUCH VERIO In Vitro Strip USE TO TEST BLOOD SUGAR ONCE DAILY AS DIRECTED 100 strip 0    bisoprolol 5 MG Oral Tab Take 1 tablet (5 mg total) by mouth daily. 90 tablet 1    triamcinolone 0.1 % External Ointment Apply 1 Application topically 2 (two) times daily.      Dupilumab 300 MG/2ML Subcutaneous Solution Auto-injector Inject 300 mg into the skin every 14 (fourteen) days.      Fluocinolone Acetonide Scalp 0.01 % External Oil Apply 1 Application topically daily. (Patient taking differently: Apply 1 Application topically as needed.)      rosuvastatin 10 MG Oral Tab Take 1 tablet (10 mg total) by mouth daily. 90 tablet 0    Blood Glucose Monitoring Suppl (ONETOUCH VERIO REFLECT) w/Device Does not apply Kit       Amphetamine-Dextroamphet ER 20 MG Oral Capsule SR 24 Hr Take 1 capsule (20 mg total) by mouth every morning.      Glucose Blood (ONETOUCH TEST VI)       lisdexamfetamine (VYVANSE) 50 MG Oral Cap Take 1 capsule (50 mg total) by mouth daily. 30 capsule 0    clobetasol 0.05 % External Ointment Apply topically 2 (two) times daily.      amphetamine-dextroamphetamine 10 MG Oral Tab Take by mouth as needed.      FETZIMA 120 MG Oral Capsule SR 24 Hr Take 120 mg by mouth daily. 30 capsule 0   [5]    Allergies  Allergen Reactions    Bupropion ANXIETY and Tightness in Chest    Clonidine CONFUSION, DIZZINESS and FATIGUE    Omeprazole NAUSEA AND VOMITING    Propranolol DIZZINESS     Disorientation    Atomoxetine OTHER (SEE COMMENTS)    Carbamazepine OTHER (SEE COMMENTS)    Citalopram OTHER (SEE COMMENTS)     Not effective    Desipramine OTHER (SEE COMMENTS)     Excessive sleepiness    Methylphenidate OTHER (SEE COMMENTS)    Oxcarbazepine OTHER (SEE COMMENTS)    Thiothixene OTHER (SEE COMMENTS)    Vilazodone OTHER (SEE COMMENTS)    Escitalopram FATIGUE and INSOMNIA    Fluoxetine FATIGUE    Paroxetine FATIGUE    Sertraline FATIGUE    Venlafaxine FATIGUE

## 2025-05-12 NOTE — TELEPHONE ENCOUNTER
Dr. Real, when you correct a septal deviation does that correct a visibly crooked looking nose? Patient is asking.

## 2025-05-14 DIAGNOSIS — E78.00 HYPERCHOLESTEROLEMIA: ICD-10-CM

## 2025-05-14 DIAGNOSIS — E11.9 TYPE 2 DIABETES MELLITUS WITHOUT COMPLICATION, WITHOUT LONG-TERM CURRENT USE OF INSULIN (HCC): ICD-10-CM

## 2025-05-14 RX ORDER — DESLORATADINE 5 MG/1
5 TABLET ORAL DAILY
Qty: 90 TABLET | Refills: 1 | Status: SHIPPED | OUTPATIENT
Start: 2025-05-14

## 2025-05-14 RX ORDER — DULAGLUTIDE 0.75 MG/.5ML
0.5 INJECTION, SOLUTION SUBCUTANEOUS WEEKLY
Qty: 6 ML | Refills: 0 | Status: SHIPPED | OUTPATIENT
Start: 2025-05-14 | End: 2025-05-19 | Stop reason: DRUGHIGH

## 2025-05-14 RX ORDER — ROSUVASTATIN CALCIUM 10 MG/1
10 TABLET, COATED ORAL DAILY
Qty: 90 TABLET | Refills: 1 | Status: SHIPPED | OUTPATIENT
Start: 2025-05-14

## 2025-05-14 NOTE — TELEPHONE ENCOUNTER
Trulicity signed for the same dose 3 months supply  Should discontinue fexofenadine if starts the desloratadine.   Rosuvastatin refilled

## 2025-05-14 NOTE — TELEPHONE ENCOUNTER
Alejandra called clarifying jardiance dose. Advised 25 mg daily of jardiance per chart review as looks like dose was adjusted.  Patient should have refills on file with [facility]. Advised to reach out to [facility] and contact us in future if wants to affinity.  Mcm sent to patient.

## 2025-05-14 NOTE — TELEPHONE ENCOUNTER
Rec call from [facility] ([id number], [id number]) [address] [facility] regarding patients scripts as patient changing to them. Spoke to patient.     Trulicity. Needs refills to [facility]. Took last dose today. Reports mild constipation. Patient to stay on current dose?    While since last refill of rosuvastatin. Advised to take as prescribed. Maybe needs refill. Refill pended.    Also requesting desloratadine as he says insurance covers. Reports takes 5 mg once daily for allergies. Unsure if he had this otc or was previously taking. Pended if appropriate.. Looks like fexofenadine also on medlist.    RECENT LABS IN [facility] FROM 5.12    Last OV relevant to medication: 4/16   Last refill date:      #/refills:   Trulicity  4/16 2ml/0 refills, took last dose today, reports mild constipation  Rosuvastatin  1/7/25 90/0  Desloratadine Have not prescribed.  When pt was asked to return for OV: ????   Upcoming appt/reason:   Future Appointments   Date Time Provider Department Center   5/19/2025  1:00 PM Irina Nowak MD [facility]       Was pt informed of any over due labs: active A1c order but had A1c with dr. Salgado on 4/8              Lab Results   Component Value Date     (H) 10/31/2024    BUN 16 10/31/2024    CREATSERUM 1.04 10/31/2024    ANIONGAP 6 10/31/2024    CA 9.8 10/31/2024    OSMOCALC 293 10/31/2024    ALKPHO 52 10/31/2024    AST 17 10/31/2024    ALT 18 10/31/2024    BILT 1.4 (H) 10/31/2024    TP 6.8 10/31/2024    ALB 4.7 10/31/2024    GLOBULIN 2.1 10/31/2024     10/31/2024    K 4.2 10/31/2024     10/31/2024    CO2 28.0 10/31/2024       Lab Results   Component Value Date     04/08/2025    A1C 5.9 (H) 04/08/2025       Lab Results   Component Value Date    MALBP 0.40 04/08/2025    CREUR 107.50 04/08/2025       Lab Results   Component Value Date    CHOLEST 115 10/31/2024    TRIG 47 10/31/2024    HDL 57 10/31/2024    LDL 47 10/31/2024    VLDL 7 10/31/2024    NONHDLC  58 10/31/2024

## 2025-05-15 NOTE — TELEPHONE ENCOUNTER
Incoming (mail or fax):  fax  Received from:  [facility]  Documentation given to:  Triage incoming    New prescription request for jardiance and bisoprolol

## 2025-05-19 ENCOUNTER — LAB ENCOUNTER (OUTPATIENT)
Dept: LAB | Age: 35
End: 2025-05-19
Attending: STUDENT IN AN ORGANIZED HEALTH CARE EDUCATION/TRAINING PROGRAM
Payer: MEDICAID

## 2025-05-19 ENCOUNTER — OFFICE VISIT (OUTPATIENT)
Dept: INTERNAL MEDICINE CLINIC | Facility: CLINIC | Age: 35
End: 2025-05-19
Payer: MEDICAID

## 2025-05-19 VITALS
OXYGEN SATURATION: 97 % | DIASTOLIC BLOOD PRESSURE: 70 MMHG | RESPIRATION RATE: 16 BRPM | BODY MASS INDEX: 19.65 KG/M2 | WEIGHT: 129.63 LBS | HEART RATE: 99 BPM | TEMPERATURE: 97 F | HEIGHT: 68 IN | SYSTOLIC BLOOD PRESSURE: 110 MMHG

## 2025-05-19 DIAGNOSIS — E11.9 TYPE 2 DIABETES MELLITUS WITHOUT COMPLICATION, WITHOUT LONG-TERM CURRENT USE OF INSULIN (HCC): ICD-10-CM

## 2025-05-19 DIAGNOSIS — R00.0 TACHYCARDIA: ICD-10-CM

## 2025-05-19 DIAGNOSIS — L65.9 HAIR THINNING: ICD-10-CM

## 2025-05-19 DIAGNOSIS — L20.84 INTRINSIC ECZEMA: ICD-10-CM

## 2025-05-19 DIAGNOSIS — K21.9 GASTROESOPHAGEAL REFLUX DISEASE, UNSPECIFIED WHETHER ESOPHAGITIS PRESENT: ICD-10-CM

## 2025-05-19 DIAGNOSIS — E78.2 MIXED HYPERLIPIDEMIA: ICD-10-CM

## 2025-05-19 DIAGNOSIS — I47.11 INAPPROPRIATE SINUS TACHYCARDIA (HCC): ICD-10-CM

## 2025-05-19 DIAGNOSIS — Z00.00 PHYSICAL EXAM, ANNUAL: ICD-10-CM

## 2025-05-19 DIAGNOSIS — Z00.00 PHYSICAL EXAM, ANNUAL: Primary | ICD-10-CM

## 2025-05-19 DIAGNOSIS — E55.9 VITAMIN D DEFICIENCY: ICD-10-CM

## 2025-05-19 DIAGNOSIS — L30.9 ECZEMA, UNSPECIFIED TYPE: ICD-10-CM

## 2025-05-19 DIAGNOSIS — R53.83 OTHER FATIGUE: ICD-10-CM

## 2025-05-19 DIAGNOSIS — Z01.84 IMMUNITY STATUS TESTING: ICD-10-CM

## 2025-05-19 DIAGNOSIS — Z13.0 SCREENING FOR DEFICIENCY ANEMIA: ICD-10-CM

## 2025-05-19 DIAGNOSIS — J34.2 DEVIATED NASAL SEPTUM: ICD-10-CM

## 2025-05-19 LAB
ANION GAP SERPL CALC-SCNC: 5 MMOL/L (ref 0–18)
BUN BLD-MCNC: 21 MG/DL (ref 9–23)
CALCIUM BLD-MCNC: 10.2 MG/DL (ref 8.7–10.6)
CHLORIDE SERPL-SCNC: 104 MMOL/L (ref 98–112)
CO2 SERPL-SCNC: 30 MMOL/L (ref 21–32)
CREAT BLD-MCNC: 1.03 MG/DL (ref 0.7–1.3)
DEPRECATED HBV CORE AB SER IA-ACNC: 61 NG/ML (ref 50–336)
EGFRCR SERPLBLD CKD-EPI 2021: 97 ML/MIN/1.73M2 (ref 60–?)
EST. AVERAGE GLUCOSE BLD GHB EST-MCNC: 120 MG/DL (ref 68–126)
FASTING STATUS PATIENT QL REPORTED: YES
FOLATE SERPL-MCNC: 23.3 NG/ML (ref 5.4–?)
GLUCOSE BLD-MCNC: 106 MG/DL (ref 70–99)
HAV AB SER QL IA: REACTIVE
HAV IGM SER QL: NONREACTIVE
HBA1C MFR BLD: 5.8 % (ref ?–5.7)
IRON SATN MFR SERPL: 26 % (ref 20–50)
IRON SERPL-MCNC: 104 UG/DL (ref 65–175)
OSMOLALITY SERPL CALC.SUM OF ELEC: 291 MOSM/KG (ref 275–295)
POTASSIUM SERPL-SCNC: 4.2 MMOL/L (ref 3.5–5.1)
RUBV IGG SER QL: POSITIVE
RUBV IGG SER-ACNC: 100 IU/ML (ref 10–?)
SODIUM SERPL-SCNC: 139 MMOL/L (ref 136–145)
TOTAL IRON BINDING CAPACITY: 401 UG/DL (ref 250–425)
TRANSFERRIN SERPL-MCNC: 333 MG/DL (ref 215–365)
VIT B12 SERPL-MCNC: 1647 PG/ML (ref 211–911)
VIT D+METAB SERPL-MCNC: 76.9 NG/ML (ref 30–100)

## 2025-05-19 PROCEDURE — 82746 ASSAY OF FOLIC ACID SERUM: CPT

## 2025-05-19 PROCEDURE — 82728 ASSAY OF FERRITIN: CPT

## 2025-05-19 PROCEDURE — 83036 HEMOGLOBIN GLYCOSYLATED A1C: CPT

## 2025-05-19 PROCEDURE — 83550 IRON BINDING TEST: CPT

## 2025-05-19 PROCEDURE — 83540 ASSAY OF IRON: CPT

## 2025-05-19 PROCEDURE — 36415 COLL VENOUS BLD VENIPUNCTURE: CPT

## 2025-05-19 PROCEDURE — 86765 RUBEOLA ANTIBODY: CPT

## 2025-05-19 PROCEDURE — 86708 HEPATITIS A ANTIBODY: CPT

## 2025-05-19 PROCEDURE — 86735 MUMPS ANTIBODY: CPT

## 2025-05-19 PROCEDURE — 82607 VITAMIN B-12: CPT

## 2025-05-19 PROCEDURE — 86762 RUBELLA ANTIBODY: CPT

## 2025-05-19 PROCEDURE — 82306 VITAMIN D 25 HYDROXY: CPT

## 2025-05-19 PROCEDURE — 86709 HEPATITIS A IGM ANTIBODY: CPT

## 2025-05-19 PROCEDURE — 80048 BASIC METABOLIC PNL TOTAL CA: CPT

## 2025-05-19 RX ORDER — DULAGLUTIDE 1.5 MG/.5ML
1.5 INJECTION, SOLUTION SUBCUTANEOUS WEEKLY
Qty: 6 ML | Refills: 0 | Status: SHIPPED | OUTPATIENT
Start: 2025-05-19

## 2025-05-19 NOTE — PROGRESS NOTES
[facility]    CHIEF COMPLAINT:   Chief Complaint   Patient presents with    Physical     Reviewed Preventative/Wellness form with patient.            The following individual(s) verbally consented to be recorded using ambient AI listening technology and understand that they can each withdraw their consent to this listening technology at any point by asking the clinician to turn off or pause the recording:    Patient name: Jamal Irving    HPI:   Jamal Irving is a 34 year old male who presents for a complete physical exam.   Problem List[1]     History of Present Illness  Fasting blood glucose levels remain elevated over 125 mg/dL despite Trulicity use. He experiences constipation as a side effect of Trulicity. He has been managing gas issues with Benefiber, which was recommended by Gastroenterologist.     He experiences urinary frequency and urgency, initially treated with Gemtesa without improvement. A urologist suggested a possible overactive bladder. Previous imaging in September showed no abnormalities. He reports an incident of urgent urination while driving, impacting his daily life.    He has a deviated nasal septum causing breathing difficulties and has a surgery scheduled for septoplasty. He also has a history of allergies and eczema, managed with  Dupixent.    He follows a high-protein diet with limited carbohydrates and vegetables, exercises twice a week, and has abstained from alcohol since 2018. 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    # T2 DM    Metformin caused more Bloating  Recent hemoglobin A1c of 5.9. He is currently taking Jardiance and Tradjenta, and checks his blood sugar every two days, noting it has been stable. He aims to lower his A1c below 5.7.   04/2025: witched from Tradjenta to Trulicity for better glycemic control.  05/2025: Tolerating Trulicity, however gained weight on it. Will increase the dose from 0.75 - 1.5mg weekly  Eye exam:  12/19/2024 - No  - Sean Askew MD   Foot exam 04/16/2025: Bilateral barefoot skin diabetic exam is abnormal with dry skin, however improved from prior.  Bilateral monofilament/sensation of both feet is normal. Vibration sensation is normal.  Pulsation pedal pulse exam of both lower legs/feet is normal as well.     # HLD   Did not tolerate atorvastatin in the past  Currently taking Rosuvastatin 10 mg daily, tolerating well.   CT CAC Score 11/25/24: score of 0   Per Cardiology - Patient wishes for aggressive primary prevention, with LDL target <55 and ApoB <60    His sister also has high cholesterol and is on rosuvastatin.        # Inappropriate sinus tachycardia  Elevated heart rate since 2020s  EKG on 10/29/2024 showe sinus tachycardia.   Was started 1/2025 on Bisoprolol, currently taking 5 mg daily  Tolerating well, no dizziness, no Lightheadedness.   His heart rate remains in the 90s while on bisoprolol 5 mg, which has helped maintain it below 100.  Recent Holter monitor and echocardiogram, both showing normal results. The Holter monitor indicated an avg heart rate of 90, with no significant arrhythmias. The echocardiogram showed a normal left ventricle with an ejection fraction of 56%.  Cardiology Lisa Lara - appt on 05/13/2025. Stress test was ordered.     # Dust and Mite allergy   On a regimen of Flonase, azelastine, and Allegra 180 mg daily. He reports improvement in symptoms with this treatment.    # Nasal Septal deviation  ENT - Dr. Real  Plans to do Septoplasty      # ADHD   # Depression and Anxiety  On treatment since 2009  Currently follows with Psychiatry, Hernando Zechariah  Takes Adderal XR (20 mg ) in am and then Vivance 50 mg .in the afternoon. Takes PRN Adderal 10 mg 1-2 times a week   Taking Fetzima 120 mg   He recently stopped guanfacine after consulting with his psychiatrist.      # Eczema/atopic dermatitis, and skin biopsy showed early lichen simplex chronicus. started on Dupixent , first  injection 12/20/2024, which has improved his skin condition. He reports less redness and flakiness. Follows with dermatology Dr. Rosio Gotti      # Urinary urgency, which was evaluated last year without any findings. The symptoms have returned about a month ago, with a strong urge to urinate quickly once the sensation arises.  Saw Urology - Dr. Peng Taylor ([facility]), who suspected the Overactive bladder.   Trial of gemtesa for 1 month - patient did not notice any difference with medication  Had Ultrasound kidney/bladder done 10/31/2024 with normal-appearing kidneys and normal postvoid volume of 2 cc    # h/o subclinical hypothyroidism   Recent TSH level of 5.4, with normal T4 levels. He experiences fatigue but no other symptoms of hypothyroidism. His mother is on levothyroxine.  TSH on 4/16/2025 was 2.471, free T4 - 1.4, total T3 - 0.88.    # GERD  A recent consultation with a gastroenterologist  Arely De Souza MD ([facility]), led to a recommendation for an endoscopy due to symptoms of gas, acid reflux, and fullness. He has a history of acid reflux with a burning sensation in the throat and is currently taking famotidine.   Further management depends on results    # Flatulence  He experiences significant gas, which worsened with metformin. He has not tried a low FODMAP diet but is considering it, as his mother has similar symptoms and is on this diet.  Psylium Husk switched to benefiber due to lower risk of flatulence     Occupation: He is considering returning to graduate school for a PhD in forensic psychology at [employer]. Classes will be mostly online. Classes might start in the Fall this year.    Diet: High in protein and low on vegetables and carbs. Needs to do better with vegetables.  Exercise:  2 times a week in the GYM, Cardio for 10 min and weights.  Eye exam:12/19/2024 - No DR George Askew MD   Dentist: Did not see dentist in more than 1 year  Driving: Yes, with the  seatbelt  Smoking: Never  Alcohol: Drank alcohol 2012 -2018, never again  No other substance use.   Sexual activity: Currently not    Vaccinations:  Influenza: 09/2024  COVID: Vaccinated x 6, last one 2024  Pneumococcal: PCV 20 in 2023  Tdap/Td: 2022      Screenings:  Colonoscopy: -,   not currently indicated,  Diabetes screening: Last A1c value was 5.9% done 4/8/2025.  Lipid screening: Cholesterol: 115, done on 10/31/2024.  HDL Cholesterol: 57, done on 10/31/2024.  TriGlycerides 47, done on 10/31/2024.  LDL Cholesterol: 47, done on 10/31/2024.       Wt Readings from Last 6 Encounters:   05/19/25 129 lb 9.6 oz (58.8 kg)   05/06/25 123 lb (55.8 kg)   04/16/25 123 lb (55.8 kg)   01/07/25 119 lb 6.4 oz (54.2 kg)   12/03/24 122 lb (55.3 kg)   11/25/24 124 lb (56.2 kg)     Body mass index is 19.71 kg/m².       Current Medications[2]   Past Medical History[3]   Past Surgical History[4]   Family History[5]        REVIEW OF SYSTEMS:   Review of Systems   Constitutional:  Negative for chills and fever.   HENT:  Negative for congestion, ear pain, hearing loss and sinus pain.    Eyes:  Negative for blurred vision, double vision and pain.   Respiratory:  Negative for cough, shortness of breath and wheezing.    Cardiovascular:  Positive for palpitations (less frequent than before). Negative for chest pain and leg swelling.   Gastrointestinal:  Negative for abdominal pain, constipation, diarrhea, heartburn, nausea and vomiting.   Genitourinary:  Positive for urgency. Negative for frequency.   Musculoskeletal:  Negative for back pain and joint pain.   Skin:  Negative for rash.   Neurological:  Negative for dizziness and headaches.   Endo/Heme/Allergies:  Positive for environmental allergies.   Psychiatric/Behavioral: Negative.          EXAM:   /70 (BP Location: Left arm, Patient Position: Sitting, Cuff Size: adult)   Pulse 99   Temp 97.2 °F (36.2 °C) (Temporal)   Resp 16   Ht 5' 8\" (1.727 m)   Wt 129 lb 9.6 oz (58.8 kg)    SpO2 97%   BMI 19.71 kg/m²   Body mass index is 19.71 kg/m².   Physical Exam  Vitals reviewed.   Constitutional:       General: He is not in acute distress.     Appearance: Normal appearance. He is not ill-appearing or toxic-appearing.   HENT:      Head: Normocephalic and atraumatic.      Right Ear: Tympanic membrane, ear canal and external ear normal.      Left Ear: Tympanic membrane, ear canal and external ear normal.      Mouth/Throat:      Mouth: Mucous membranes are moist.      Pharynx: Oropharynx is clear. No oropharyngeal exudate or posterior oropharyngeal erythema.   Eyes:      Extraocular Movements: Extraocular movements intact.      Conjunctiva/sclera: Conjunctivae normal.      Pupils: Pupils are equal, round, and reactive to light.   Cardiovascular:      Rate and Rhythm: Regular rhythm. Tachycardia present.      Pulses: Normal pulses.      Heart sounds: Normal heart sounds. No murmur heard.     No friction rub. No gallop.   Pulmonary:      Effort: Pulmonary effort is normal. No respiratory distress.      Breath sounds: Normal breath sounds. No stridor. No wheezing, rhonchi or rales.   Abdominal:      General: Abdomen is flat. There is no distension.      Palpations: There is no mass.      Tenderness: There is no abdominal tenderness. There is no guarding or rebound.      Hernia: No hernia is present.   Musculoskeletal:      Cervical back: Normal range of motion.      Thoracic back: Scoliosis (slight levoscoliosis) present.      Right lower leg: No edema.      Left lower leg: No edema.   Lymphadenopathy:      Cervical: No cervical adenopathy.   Skin:     General: Skin is warm.      Capillary Refill: Capillary refill takes less than 2 seconds.      Coloration: Skin is not jaundiced or pale.      Findings: No bruising, erythema, lesion or rash.   Neurological:      General: No focal deficit present.      Mental Status: He is alert and oriented to person, place, and time.      Cranial Nerves: No cranial  nerve deficit.      Sensory: No sensory deficit.      Motor: No weakness.   Psychiatric:         Mood and Affect: Mood normal.         Behavior: Behavior normal.         Thought Content: Thought content normal.         Judgment: Judgment normal.          Labs:   Lab Results   Component Value Date/Time    WBC 4.7 10/31/2024 12:50 PM    HGB 14.8 10/31/2024 12:50 PM    .0 10/31/2024 12:50 PM      Lab Results   Component Value Date/Time     (H) 10/31/2024 12:50 PM     10/31/2024 12:50 PM    K 4.2 10/31/2024 12:50 PM     10/31/2024 12:50 PM    CO2 28.0 10/31/2024 12:50 PM    CREATSERUM 1.04 10/31/2024 12:50 PM    CA 9.8 10/31/2024 12:50 PM    ALB 4.7 10/31/2024 12:50 PM    TP 6.8 10/31/2024 12:50 PM    ALKPHO 52 10/31/2024 12:50 PM    AST 17 10/31/2024 12:50 PM    ALT 18 10/31/2024 12:50 PM    BILT 1.4 (H) 10/31/2024 12:50 PM    TSH 2.471 04/16/2025 04:32 PM    T4F 1.4 04/16/2025 04:32 PM        Lab Results   Component Value Date/Time    CHOLEST 115 10/31/2024 12:50 PM    HDL 57 10/31/2024 12:50 PM    TRIG 47 10/31/2024 12:50 PM    LDL 47 10/31/2024 12:50 PM    NONHDLC 58 10/31/2024 12:50 PM       Lab Results   Component Value Date/Time    A1C 5.9 (H) 04/08/2025 02:18 PM      Vitamin D:    No results found for: \"VITD\"        ASSESSMENT AND PLAN:   Jamal Irving is a 34 year old male who presents for a complete physical exam.     Pt' s weight is Body mass index is 19.71 kg/m².. Recommended regular exercise.   Age appropriate screenings discussed and orders placed.  The patient indicates understanding of these issues and agrees to the plan.  Annual eye exam and Q 6 month dental exam recommended    1. Physical exam, annual  - Vitamin B12; Future  - Folic Acid Serum (Folate); Future  - Ferritin; Future  - Iron And Tibc; Future  - Vitamin D; Future    2. Immunity status testing  Patient would like to check his immunity to measles, mumps and rubella.  He also would like to know immunity to hepatitis  A.  He did his previous check for hepatitis B and he was immune to it.  - Immune Status Panel,MMR; Future  - Hep A AB, Total [E]; Future    3. Screening for deficiency anemia  The patient had previous iron deficiency anemia and would like to get his iron rechecked.  - Vitamin B12; Future  - Folic Acid Serum (Folate); Future  - Ferritin; Future  - Iron And Tibc; Future    4. Vitamin D deficiency  - Vitamin D; Future    5. Eczema, unspecified type  Follows with dermatology.  On Dupixent.  She will continue to follow-up with dermatology.  Will check for vitamins.  - Vitamin B12; Future  - Folic Acid Serum (Folate); Future    6. Type 2 diabetes mellitus without complication, without long-term current use of insulin (HCC)  Will increase Trulicity dose from 0.75 to 1.5 mg weekly.  Will check labs.  - Vitamin B12; Future  - Folic Acid Serum (Folate); Future  - Dulaglutide (TRULICITY) 1.5 MG/0.5ML Subcutaneous Solution Auto-injector; Inject 1.5 mg into the skin once a week.  Dispense: 6 mL; Refill: 0  - Basic Metabolic Panel (8) [E]; Future    7. Hair thinning  - Vitamin B12; Future  - Folic Acid Serum (Folate); Future  - Ferritin; Future  - Iron And Tibc; Future  - Vitamin D; Future    8. Tachycardia  Follows with cardiology.  Currently taking bisoprolol 5 mg daily.  Stress test is planned with cardiologist due to some winded feeling and shortness of breath with cardio exercises.  - Vitamin B12; Future  - Folic Acid Serum (Folate); Future  - Ferritin; Future  - Iron And Tibc; Future  - Vitamin D; Future  - Basic Metabolic Panel (8) [E]; Future    9. Other fatigue  - Vitamin B12; Future  - Folic Acid Serum (Folate); Future  - Ferritin; Future  - Iron And Tibc; Future  - Vitamin D; Future  - Basic Metabolic Panel (8) [E]; Future    10. Mixed hyperlipidemia  Continue follow-up with cardiologist.    11. Inappropriate sinus tachycardia (HCC)  Continue follow-up with cardiologist    12. Intrinsic eczema  Continue follow-up with  dermatologist    13. Deviated nasal septum  Follow-up with ENT and proceed with nasal septoplasty as planned.    14. Gastroesophageal reflux disease, unspecified whether esophagitis present  Seen gastroenterology, was recommended to continue with famotidine.  EGD is planned.    Return in about 3 months (around 8/19/2025) for DM check.    Irina Nowak MD       [1]   Patient Active Problem List  Diagnosis    Tachycardia    Mixed hyperlipidemia    Intrinsic eczema    Depression    Hair thinning    Type 2 diabetes mellitus without complication, without long-term current use of insulin (HCC)    Attention deficit hyperactivity disorder (ADHD)    Anxiety disorder    Dry skin dermatitis    Eye pressure    Inappropriate sinus tachycardia (HCC)    Rapid palpitations    Lightheadedness    Allergy to dust    Mite allergy    Deviated nasal septum    Gastroesophageal reflux disease   [2]   Current Outpatient Medications   Medication Sig Dispense Refill    Dulaglutide (TRULICITY) 1.5 MG/0.5ML Subcutaneous Solution Auto-injector Inject 1.5 mg into the skin once a week. 6 mL 0    rosuvastatin 10 MG Oral Tab Take 1 tablet (10 mg total) by mouth daily. 90 tablet 1    desloratadine 5 MG Oral Tab Take 1 tablet (5 mg total) by mouth daily. 90 tablet 1    ARIPiprazole 2 MG Oral Tab Take 1 tablet (2 mg total) by mouth every morning.      Vibegron (GEMTESA) 75 MG Oral Tab Take 75 mg by mouth daily.      azelastine 137 MCG/SPRAY Nasal Solution       busPIRone 15 MG Oral Tab       Ferrous Bisglycinate Chelate 28 MG Oral Cap       fluticasone propionate 50 MCG/ACT Nasal Suspension 2 sprays by Each Nare route daily. 11.1 mL 1    JARDIANCE 25 MG Oral Tab Take 1 tablet (25 mg total) by mouth daily. 90 tablet 0    omega-3-acid ethyl esters 1 g Oral Cap Take 2 capsules (2 g total) by mouth 2 (two) times daily. 360 capsule 0    Lancets (ONETOUCH DELICA PLUS EPDEXR64C) Does not apply Misc USE TO TEST BLOOD SUGAR ONCE DAILY 100 each 0    ONETOUCH  VERIO In Vitro Strip USE TO TEST BLOOD SUGAR ONCE DAILY AS DIRECTED 100 strip 0    bisoprolol 5 MG Oral Tab Take 1 tablet (5 mg total) by mouth daily. 90 tablet 1    triamcinolone 0.1 % External Ointment Apply 1 Application topically 2 (two) times daily.      Dupilumab 300 MG/2ML Subcutaneous Solution Auto-injector Inject 300 mg into the skin every 14 (fourteen) days.      Fluocinolone Acetonide Scalp 0.01 % External Oil Apply 1 Application topically daily. (Patient taking differently: Apply 1 Application topically as needed.)      Blood Glucose Monitoring Suppl (ONETOUCH VERIO REFLECT) w/Device Does not apply Kit       Amphetamine-Dextroamphet ER 20 MG Oral Capsule SR 24 Hr Take 1 capsule (20 mg total) by mouth every morning.      Glucose Blood (ONETOUCH TEST VI)       lisdexamfetamine (VYVANSE) 50 MG Oral Cap Take 1 capsule (50 mg total) by mouth daily. 30 capsule 0    clobetasol 0.05 % External Ointment Apply topically 2 (two) times daily.      amphetamine-dextroamphetamine 10 MG Oral Tab Take by mouth as needed.      FETZIMA 120 MG Oral Capsule SR 24 Hr Take 120 mg by mouth daily. 30 capsule 0   [3]   Past Medical History:   ALCOHOL USE    Quit in 2018    Allergic rhinitis    Anemia    Anxiety    Depression    Diabetes (HCC)    Esophageal reflux    Hyperlipidemia   [4] History reviewed. No pertinent surgical history.  [5]   Family History  Problem Relation Age of Onset    Hyperlipidemia Mother     Depression Mother     Fibromyalgia Mother     Anemia Mother         On iron supplement    Hypertension Mother         On Ramipril and Eplerenone    Lipids Mother         High triglycerides as well. On Atorvastatin, Ezetimibe, Vascepa    Musculo-skelatal Disorder Mother         Fibromyalgia - On Milnacipran.    Obesity Mother         Accompanied with sleep apnea    Psychiatric Mother         ADHD, Obsessive Compulsive Disorder    Hyperlipidemia Father     Anxiety Father     Heart Disease Father     Diabetes Father          On Metformin, Empagliflozin, Semaglutide/Ozempic.    Bleeding Disorders Father         Low platelets - ITP    Hypertension Father         On Ramipril and Metoprolol    Lipids Father         On Rosuvastatin, Ezetimibe, Vascepa.    Psychiatric Father         Generalized Anxiety Disorder - On Sertraline, Aripiprazole, and Pregabalin.    Anemia Father         On iron supplement    Heart Disorder Father         Coronary Artery Disease. Triple bypass surgery in 2025. Calcium score above 2200    Depression Sister         On Levomilnacipran    Psychiatric Sister         ADHD - stimulant medication    Lipids Sister     Musculo-skelatal Disorder Sister     Asthma Maternal Grandmother     Obesity Maternal Grandmother     Stroke Paternal Grandfather

## 2025-05-19 NOTE — PATIENT INSTRUCTIONS
VISIT SUMMARY:  During your follow-up visit, we discussed your ongoing management of type 2 diabetes, gastrointestinal issues, urinary frequency and urgency, eczema, and mild scoliosis. We reviewed your current medications and made some adjustments to better control your symptoms. We also discussed upcoming procedures and follow-up plans with specialists.    YOUR PLAN:  -TYPE 2 DIABETES MELLITUS: Your fasting blood glucose levels are still high, so we will increase your Trulicity dose to 1.5 mg. Trulicity helps control blood sugar levels. Continue using Benefiber to manage constipation. We will monitor your blood glucose levels closely. A new prescription for Trulicity for three months has been provided.    -GASTROESOPHAGEAL REFLUX DISEASE (GERD): GERD is causing symptoms like gas, acid reflux, and fullness. Continue taking famotidine as it helps reduce stomach acid. An endoscopy is scheduled for June to further investigate your symptoms. Stop taking Trulicity one week before the endoscopy and Jardiance three days before. Follow up with your GI specialist after the procedure.    -URINARY FREQUENCY AND URGENCY: You are experiencing frequent and urgent urination, which may be due to an overactive bladder. Since Gemtesa did not help, follow up with your urologist. If symptoms persist, a neurology referral may be needed. Start bladder training exercises as recommended.    -ECZEMA: Eczema is a skin condition that causes itchy and inflamed patches of skin. Continue taking bisalactetine for allergy management, as allergies can worsen eczema. Monitor your symptoms and we will adjust your treatment if needed.    -SCOLIOSIS: Scoliosis is a condition where the spine curves sideways. Engage in exercises to strengthen your back muscles, especially the trapezius and rhomboid muscles , to prevent progression and manage symptoms.         Contains text generated by Camila

## 2025-05-21 LAB
MEV IGG SER-ACNC: 50.3 AU/ML (ref 16.5–?)
MUV IGG SER IA-ACNC: 77.7 AU/ML (ref 11–?)

## 2025-06-02 RX ORDER — BLOOD SUGAR DIAGNOSTIC
1 STRIP MISCELLANEOUS AS DIRECTED
Qty: 100 STRIP | Refills: 0 | OUTPATIENT
Start: 2025-06-02

## 2025-06-02 RX ORDER — BLOOD SUGAR DIAGNOSTIC
1 STRIP MISCELLANEOUS AS DIRECTED
Qty: 100 STRIP | Refills: 0 | Status: SHIPPED | OUTPATIENT
Start: 2025-06-02

## 2025-06-09 ENCOUNTER — TELEPHONE (OUTPATIENT)
Dept: INTERNAL MEDICINE CLINIC | Facility: CLINIC | Age: 35
End: 2025-06-09

## 2025-06-09 NOTE — TELEPHONE ENCOUNTER
Incoming (mail or fax):  FAX   Received from:  [facility]  Documentation given to:  triage in

## 2025-06-17 RX ORDER — FAMOTIDINE 20 MG/1
20 TABLET, FILM COATED ORAL
COMMUNITY
Start: 2025-05-19

## 2025-06-17 RX ORDER — EZETIMIBE 10 MG/1
10 TABLET ORAL
COMMUNITY

## 2025-06-17 RX ORDER — POLYETHYLENE GLYCOL 3350 17 G/17G
POWDER, FOR SOLUTION ORAL
COMMUNITY
Start: 2025-05-19

## 2025-06-17 RX ORDER — VITAMIN E (DL,TOCOPHERYL ACET) 45 MG/0.25
DROPS ORAL
COMMUNITY

## 2025-06-17 NOTE — DISCHARGE INSTRUCTIONS
HOME INSTRUCTIONS  Endoscopic Sinus Surgery  The sinuses are hollow areas formed by the bones of the face. Normally, a thin layer of mucus drains from the sinuses into the nose. If the drainage path is blocked, problems such as infection can result. Endoscopic sinus surgery can be done to help clear blockages. The healthcare provider uses a thin, lighted tube (endoscope) that's put into your nose. The tube lets the provider see and operate inside your nose and sinuses.  Straightening the septum  You may require straightening of your septum during your surgery. The septum is a piece of cartilage and bone that runs straight down the inside of the nose. It divides the nose into 2 sides. A deviated septum is crooked instead of straight. A crooked septum can cause breathing problems. To fix a deviated septum, the healthcare provider reshapes or trims the cartilage and bone. There's enough septum left for the nose to hold its shape. But the air has more space to move in and out of the nose. This improves your breathing.     Removing polyps  You may or may not have nasal polyps, which are small growths originating from the sinuses. They can grow in both the nose and sinuses. The healthcare provider may use different ways to take them out. Often, the provider uses special tools to take out the polyps without harming nearby tissues. If you have polyps, you will need to remain on nasal rinses indefinitely with nasal steroid solution to prevent their regrowth. Patients with asthma and severe allergies have the highest risk of polyp regrowth.    Opening the sinuses  The sinuses are made up of many small air spaces, like a honeycomb. All of the sinuses have a lining that makes mucus. In some cases, the drainage path is blocked. The healthcare provider may open the thin walls of bone that separate the air spaces. This makes a passage for mucus to drain more easily.     Side view.     Clearing the major outflow pathway of the  sinuses  The osteomeatal complex is a term for a major outflow tract of your sinuses. When this part becomes blocked, you may get symptoms in your maxillary, ethmoid, and frontal sinuses. Opening this area is the main step in most sinus surgeries. The uncinate process is a small piece of bone and tissue in the sinuses. It forms an outlet for part of the sinuses. If this tissue is swollen (inflamed), it will block drainage of mucus. The healthcare provider may take out the uncinate process so that mucus can drain.      Risks  As with any surgery, nasal surgery has some risks. These include a risk of bleeding (less than 5% will need return to the operating room for cauterization), infection, persistent nasal crusting, and risks of anesthesia. There is a very small risk of brain fluid leak from your nose (CSF leak). Your breathing will likely be much better after surgery, however patients with severe allergies may still experience nasal congestion intermittently. Your breathing may not be perfectly equal on each side of your nose following surgery. The following are the possible risks.  Bleeding  Infection  Scar formation inside the nose  Tear duct injury (excessive tears)  Voice Change  Possible Cerebrospinal (brain) fluid leak requiring repair  Temporary or permanent damage to the orbit (eye) causing double vision or even blindness   Nasal Deformity  Septal perforation or hematoma  Dry Nose or Excessive Crusting  Need for revision surgery  Risks of anesthesia (Your anesthesiologist will discuss these with you before the start of the surgery)    After Sinus Surgery  After septoplasty, you’ll be taken to a recovery area or to your hospital room. Your experience may be as follows:   You will have plastic splints inside of your nose with a suture holding it in place. This reduces bleeding and helps with healing. You may also have bandages (dressings) on the outside of your nose for the first 3-5 days after surgery  It’s  normal to have some mucus and blood drain from your nose. Until packing is removed, you may have to breathe through your mouth.  Avoid blowing your nose for 2 weeks after surgery, and if you have to sneeze, do so with your mouth open  You may have some swelling or bruising around your eyes, although this is very rare  Expect some throat dryness and irritation.  You will likely have some numbness of the upper teeth and gums which is expected. This may take up to 3 months to return to normal.  Pain medicine will be prescribed as needed.  You will be prescribed Afrin nasal spray (Use 2 sprays in each nostril, twice daily for the first 3 days after surgery)  You will be prescribed nasal saline rinses (We recommend Neilmed Sinus Rinse bottle with saline packets; PLEASE USE DISTILLED WATER; You will start this on day 4 after surgery, and continue until you are told to stop by your surgeon)  You will also be prescribed antibiotics to take for 7-10 days after surgery        Follow-up care  You’ll need to follow up with your healthcare provider after your surgery. Here's what to expect:   Any splints or packing will be removed around 1 week after surgery. Your surgeon will also clean your nose out using instruments in the office. You may take 1 dose of the prescribed pain medicine prior to the appointment if you have someone to drive you to and from the office. Please avoid driving while on pain medication.  After the splint or packing is removed, you’ll most likely breathe better than you did before surgery.  You may have minor numbness, pain, swelling, and a little stiffness under the tip of the nose.  In a few days, the inside of your nose may swell. Or a scab or crust may make it hard to breathe through your nose again. Leave the scab alone. Your provider will remove it during a follow up visit. Using saline (irrigation or aerosol) regularly after surgery helps to reduce the amount of crusting at each visit.  You will be  required to follow up on weeks 1, 2, 4, 8, and 20, after surgery for nose cleanings and to ensure healing, as well as to manage you nasal medications for long lasting results.  You will then be seen annually from that point  Contact your surgeon if you have any questions or concerns.    [employer] last reviewed this educational content on 10/1/2021  © 1366-4754 [employer]. All rights reserved. This information is not intended as a substitute for professional medical care. Always follow your healthcare professional's instructions.      [facility] HOME CARE INSTRUCTIONS: POST-OP ANESTHESIA  The medication that you received for sedation or general anesthesia can last up to 24 hours. Your judgment and reflexes may be altered, even if you feel like your normal self.      We Recommend:   Do not drive any motor vehicle or bicycle   Avoid mowing the lawn, playing sports, or working with power tools/applicances (power saws, electric knives or mixers)   That you have someone stay with you on your first night home   Do not drink alcohol or take sleeping pills or tranquilizers   Do not sign legal documents within 24 hours of your procedure   If you had a nerve block for your surgery, take extra care not to put any pressure on your arm or hand for 24 hours    It is normal:  For you to have a sore throat if you had a breathing tube during surgery (while you were asleep!). The sore throat should get better within 48 hours. You can gargle with warm salt water (1/2 tsp in 4 oz warm water) or use a throat lozenge for comfort  To feel muscle aches or soreness especially in the abdomen, chest or neck. The achy feeling should go away in the next 24 hours  To feel weak, sleepy or \"wiped out\". Your should start feeling better in the next 24 hours.   To experience mild discomforts such as sore lip or tongue, headache, cramps, gas pains or a bloated feeling in your abdomen.   To experience mild back pain or soreness for a day or  two if you had spinal or epidural anesthesia.   If you had laparoscopic surgery, to feel shoulder pain or discomfort on the day of surgery.   For some patients to have nausea after surgery/anesthesia    If you feel nausea or experience vomiting:   Try to move around less.   Eat less than usual or drink only liquids until the next morning   Nausea should resolve in about 24 hours    If you have a problem when you are at home:    Call your surgeons office   Discharge Instructions: After Your Surgery  You’ve just had surgery. During surgery, you were given medicine called anesthesia to keep you relaxed and free of pain. After surgery, you may have some pain or nausea. This is common. Here are some tips for feeling better and getting well after surgery.   Going home  Your healthcare provider will show you how to take care of yourself when you go home. They'll also answer your questions. Have an adult family member or friend drive you home. For the first 24 hours after your surgery:   Don't drive or use heavy equipment.  Don't make important decisions or sign legal papers.  Take medicines as directed.  Don't drink alcohol.  Have someone stay with you, if needed. They can watch for problems and help keep you safe.  Be sure to go to all follow-up visits with your healthcare provider. And rest after your surgery for as long as your provider tells you to.   Coping with pain  If you have pain after surgery, pain medicine will help you feel better. Take it as directed, before pain becomes severe. Also, ask your healthcare provider or pharmacist about other ways to control pain. This might be with heat, ice, or relaxation. And follow any other instructions your surgeon or nurse gives you.      Stay on schedule with your medicine.     Tips for taking pain medicine  To get the best relief possible, remember these points:   Pain medicines can upset your stomach. Taking them with a little food may help.  Most pain relievers taken  by mouth need at least 20 to 30 minutes to start to work.  Don't wait till your pain becomes severe before you take your medicine. Try to time your medicine so that you can take it before starting an activity. This might be before you get dressed, go for a walk, or sit down for dinner.  Constipation is a common side effect of some pain medicines. Call your healthcare provider before taking any medicines, such as laxatives or stool softeners, to help ease constipation. Also ask if you should skip any foods. Drinking lots of fluids and eating foods, such as fruits and vegetables, that are high in fiber can also help. Remember, don't take laxatives unless your surgeon has prescribed them.  Drinking alcohol and taking pain medicine can cause dizziness and slow your breathing. It can even be deadly. Don't drink alcohol while taking pain medicine.  Pain medicine can make you react more slowly to things. Don't drive or run machinery while taking pain medicine.  Your healthcare provider may tell you to take acetaminophen to help ease your pain. Ask them how much you're supposed to take each day. Acetaminophen or other pain relievers may interact with your prescription medicines or other over-the-counter (OTC) medicines. Some prescription medicines have acetaminophen and other ingredients in them. Using both prescription and OTC acetaminophen for pain can cause you to accidentally overdose. Read the labels on your OTC medicines with care. This will help you to clearly know the list of ingredients, how much to take, and any warnings. It may also help you not take too much acetaminophen. If you have questions or don't understand the information, ask your pharmacist or healthcare provider to explain it to you before you take the OTC medicine.   Managing nausea  Some people have an upset stomach (nausea) after surgery. This is often because of anesthesia, pain, or pain medicine, less movement of food in the stomach, or the  stress of surgery. These tips will help you handle nausea and eat healthy foods as you get better. If you were on a special food plan before surgery, ask your healthcare provider if you should follow it while you get better. Check with your provider on how your eating should progress. It may depend on the surgery you had. These general tips may help:   Don't push yourself to eat. Your body will tell you when to eat and how much.  Start off with clear liquids and soup. They're easier to digest.  Next try semi-solid foods as you feel ready. These include mashed potatoes, applesauce, and gelatin.  Slowly move to solid foods. Don’t eat fatty, rich, or spicy foods at first.  Don't force yourself to have 3 large meals a day. Instead eat smaller amounts more often.  Take pain medicines with a small amount of solid food, such as crackers or toast. This helps prevent nausea.  When to call your healthcare provider  Call your healthcare provider right away if you have any of these:   You still have too much pain, or the pain gets worse, after taking the medicine. The medicine may not be strong enough. Or there may be a complication from the surgery.  You feel too sleepy, dizzy, or groggy. The medicine may be too strong.  Side effects, such as nausea or vomiting. Your healthcare provider may advise taking other medicines to treat these or may change your treatment plan..  Skin changes, such as rash, itching, or hives. This may mean you have an allergic reaction. Your provider may advise taking other medicines.  The incision looks different (for instance, part of it opens up).  Bleeding or fluid leaking from the incision site, and you weren't told to expect that.  Fever of 100.4°F (38°C) or higher, or as directed by your healthcare provider.  Call 911  Call 911 right away if you have:   Trouble breathing  Facial swelling    If you have obstructive sleep apnea   You were given anesthesia medicine during surgery to keep you  comfortable and free of pain. After surgery, you may have more apnea spells because of this medicine and other medicines you were given. The spells may last longer than normal.    At home:  Keep using the continuous positive airway pressure (CPAP) device when you sleep. Unless your healthcare provider tells you not to, use it when you sleep, day or night. CPAP is a common device used to treat obstructive sleep apnea.  Talk with your provider before taking any pain medicine, muscle relaxants, or sedatives. Your provider will tell you about the possible dangers of taking these medicines.  Contact your provider if your sleeping changes a lot even when taking medicines as directed.  [employer] last reviewed this educational content on 4/1/2024  This information is for informational purposes only. This is not intended to be a substitute for professional medical advice, diagnosis, or treatment. Always seek the advice and follow the directions from your physician or other qualified health care provider.  © 9771-9014 [employer]. All rights reserved. This information is not intended as a substitute for professional medical care. Always follow your healthcare professional's instructions.

## 2025-06-19 ENCOUNTER — ANESTHESIA (OUTPATIENT)
Dept: SURGERY | Facility: HOSPITAL | Age: 35
End: 2025-06-19
Payer: MEDICAID

## 2025-06-19 ENCOUNTER — ANESTHESIA EVENT (OUTPATIENT)
Dept: SURGERY | Facility: HOSPITAL | Age: 35
End: 2025-06-19
Payer: MEDICAID

## 2025-06-19 ENCOUNTER — HOSPITAL ENCOUNTER (OUTPATIENT)
Facility: HOSPITAL | Age: 35
Setting detail: HOSPITAL OUTPATIENT SURGERY
Discharge: HOME | End: 2025-06-19
Attending: STUDENT IN AN ORGANIZED HEALTH CARE EDUCATION/TRAINING PROGRAM | Admitting: STUDENT IN AN ORGANIZED HEALTH CARE EDUCATION/TRAINING PROGRAM
Payer: MEDICAID

## 2025-06-19 VITALS
DIASTOLIC BLOOD PRESSURE: 68 MMHG | RESPIRATION RATE: 16 BRPM | BODY MASS INDEX: 20.25 KG/M2 | OXYGEN SATURATION: 100 % | TEMPERATURE: 98 F | HEART RATE: 84 BPM | SYSTOLIC BLOOD PRESSURE: 110 MMHG | HEIGHT: 67 IN | WEIGHT: 129 LBS

## 2025-06-19 DIAGNOSIS — J34.2 DEVIATED NASAL SEPTUM: Primary | ICD-10-CM

## 2025-06-19 DIAGNOSIS — J34.3 HYPERTROPHY OF NASAL TURBINATES: ICD-10-CM

## 2025-06-19 LAB
GLUCOSE BLDC GLUCOMTR-MCNC: 108 MG/DL (ref 70–99)
GLUCOSE BLDC GLUCOMTR-MCNC: 95 MG/DL (ref 70–99)

## 2025-06-19 PROCEDURE — 30140 RESECT INFERIOR TURBINATE: CPT | Performed by: STUDENT IN AN ORGANIZED HEALTH CARE EDUCATION/TRAINING PROGRAM

## 2025-06-19 PROCEDURE — 30520 REPAIR OF NASAL SEPTUM: CPT | Performed by: STUDENT IN AN ORGANIZED HEALTH CARE EDUCATION/TRAINING PROGRAM

## 2025-06-19 RX ORDER — GLYCOPYRROLATE 0.2 MG/ML
INJECTION, SOLUTION INTRAMUSCULAR; INTRAVENOUS AS NEEDED
Status: DISCONTINUED | OUTPATIENT
Start: 2025-06-19 | End: 2025-06-19 | Stop reason: SURG

## 2025-06-19 RX ORDER — DEXTROSE MONOHYDRATE 25 G/50ML
50 INJECTION, SOLUTION INTRAVENOUS
Status: DISCONTINUED | OUTPATIENT
Start: 2025-06-19 | End: 2025-06-19

## 2025-06-19 RX ORDER — DEXAMETHASONE SODIUM PHOSPHATE 4 MG/ML
VIAL (ML) INJECTION AS NEEDED
Status: DISCONTINUED | OUTPATIENT
Start: 2025-06-19 | End: 2025-06-19 | Stop reason: SURG

## 2025-06-19 RX ORDER — METOPROLOL TARTRATE 25 MG/1
25 TABLET, FILM COATED ORAL ONCE AS NEEDED
Status: DISCONTINUED | OUTPATIENT
Start: 2025-06-19 | End: 2025-06-19 | Stop reason: HOSPADM

## 2025-06-19 RX ORDER — HYDROMORPHONE HYDROCHLORIDE 1 MG/ML
0.6 INJECTION, SOLUTION INTRAMUSCULAR; INTRAVENOUS; SUBCUTANEOUS EVERY 5 MIN PRN
Status: DISCONTINUED | OUTPATIENT
Start: 2025-06-19 | End: 2025-06-19

## 2025-06-19 RX ORDER — ACETAMINOPHEN 500 MG
1000 TABLET ORAL ONCE
Status: COMPLETED | OUTPATIENT
Start: 2025-06-19 | End: 2025-06-19

## 2025-06-19 RX ORDER — ACETAMINOPHEN AND CODEINE PHOSPHATE 300; 30 MG/1; MG/1
2 TABLET ORAL EVERY 6 HOURS PRN
Qty: 30 TABLET | Refills: 0 | Status: SHIPPED | OUTPATIENT
Start: 2025-06-19

## 2025-06-19 RX ORDER — LIDOCAINE HYDROCHLORIDE 10 MG/ML
INJECTION, SOLUTION EPIDURAL; INFILTRATION; INTRACAUDAL; PERINEURAL AS NEEDED
Status: DISCONTINUED | OUTPATIENT
Start: 2025-06-19 | End: 2025-06-19 | Stop reason: SURG

## 2025-06-19 RX ORDER — NICOTINE POLACRILEX 4 MG
15 LOZENGE BUCCAL
Status: DISCONTINUED | OUTPATIENT
Start: 2025-06-19 | End: 2025-06-19

## 2025-06-19 RX ORDER — MORPHINE SULFATE 4 MG/ML
4 INJECTION, SOLUTION INTRAMUSCULAR; INTRAVENOUS EVERY 10 MIN PRN
Status: DISCONTINUED | OUTPATIENT
Start: 2025-06-19 | End: 2025-06-19

## 2025-06-19 RX ORDER — HYDROMORPHONE HYDROCHLORIDE 1 MG/ML
0.2 INJECTION, SOLUTION INTRAMUSCULAR; INTRAVENOUS; SUBCUTANEOUS EVERY 5 MIN PRN
Status: DISCONTINUED | OUTPATIENT
Start: 2025-06-19 | End: 2025-06-19

## 2025-06-19 RX ORDER — MORPHINE SULFATE 4 MG/ML
2 INJECTION, SOLUTION INTRAMUSCULAR; INTRAVENOUS EVERY 10 MIN PRN
Status: DISCONTINUED | OUTPATIENT
Start: 2025-06-19 | End: 2025-06-19

## 2025-06-19 RX ORDER — PHENYLEPHRINE HCL 10 MG/ML
VIAL (ML) INJECTION AS NEEDED
Status: DISCONTINUED | OUTPATIENT
Start: 2025-06-19 | End: 2025-06-19 | Stop reason: SURG

## 2025-06-19 RX ORDER — MIDAZOLAM HYDROCHLORIDE 1 MG/ML
INJECTION INTRAMUSCULAR; INTRAVENOUS AS NEEDED
Status: DISCONTINUED | OUTPATIENT
Start: 2025-06-19 | End: 2025-06-19 | Stop reason: SURG

## 2025-06-19 RX ORDER — HYDROMORPHONE HYDROCHLORIDE 1 MG/ML
0.4 INJECTION, SOLUTION INTRAMUSCULAR; INTRAVENOUS; SUBCUTANEOUS EVERY 5 MIN PRN
Status: DISCONTINUED | OUTPATIENT
Start: 2025-06-19 | End: 2025-06-19

## 2025-06-19 RX ORDER — NALOXONE HYDROCHLORIDE 0.4 MG/ML
0.08 INJECTION, SOLUTION INTRAMUSCULAR; INTRAVENOUS; SUBCUTANEOUS AS NEEDED
Status: DISCONTINUED | OUTPATIENT
Start: 2025-06-19 | End: 2025-06-19

## 2025-06-19 RX ORDER — ONDANSETRON 2 MG/ML
INJECTION INTRAMUSCULAR; INTRAVENOUS AS NEEDED
Status: DISCONTINUED | OUTPATIENT
Start: 2025-06-19 | End: 2025-06-19 | Stop reason: SURG

## 2025-06-19 RX ORDER — SOD CHLOR,BICARB/SQUEEZ BOTTLE
PACKET, WITH RINSE DEVICE NASAL
Qty: 50 EACH | Refills: 2 | Status: SHIPPED | OUTPATIENT
Start: 2025-06-19

## 2025-06-19 RX ORDER — LIDOCAINE HYDROCHLORIDE AND EPINEPHRINE 10; 10 MG/ML; UG/ML
INJECTION, SOLUTION INFILTRATION; PERINEURAL AS NEEDED
Status: DISCONTINUED | OUTPATIENT
Start: 2025-06-19 | End: 2025-06-19 | Stop reason: HOSPADM

## 2025-06-19 RX ORDER — OXYMETAZOLINE HYDROCHLORIDE 0.05 G/100ML
2 SPRAY NASAL 2 TIMES DAILY
Qty: 30 ML | Refills: 0 | Status: SHIPPED | OUTPATIENT
Start: 2025-06-19

## 2025-06-19 RX ORDER — SODIUM CHLORIDE, SODIUM LACTATE, POTASSIUM CHLORIDE, CALCIUM CHLORIDE 600; 310; 30; 20 MG/100ML; MG/100ML; MG/100ML; MG/100ML
INJECTION, SOLUTION INTRAVENOUS CONTINUOUS
Status: DISCONTINUED | OUTPATIENT
Start: 2025-06-19 | End: 2025-06-19

## 2025-06-19 RX ORDER — MORPHINE SULFATE 10 MG/ML
6 INJECTION, SOLUTION INTRAMUSCULAR; INTRAVENOUS EVERY 10 MIN PRN
Status: DISCONTINUED | OUTPATIENT
Start: 2025-06-19 | End: 2025-06-19

## 2025-06-19 RX ORDER — ROCURONIUM BROMIDE 10 MG/ML
INJECTION, SOLUTION INTRAVENOUS AS NEEDED
Status: DISCONTINUED | OUTPATIENT
Start: 2025-06-19 | End: 2025-06-19 | Stop reason: SURG

## 2025-06-19 RX ORDER — NICOTINE POLACRILEX 4 MG
30 LOZENGE BUCCAL
Status: DISCONTINUED | OUTPATIENT
Start: 2025-06-19 | End: 2025-06-19

## 2025-06-19 RX ORDER — SOD CHLOR,BICARB/SQUEEZ BOTTLE
PACKET, WITH RINSE DEVICE NASAL
Qty: 1 EACH | Refills: 0 | Status: SHIPPED | OUTPATIENT
Start: 2025-06-19

## 2025-06-19 RX ADMIN — SODIUM CHLORIDE, SODIUM LACTATE, POTASSIUM CHLORIDE, CALCIUM CHLORIDE: 600; 310; 30; 20 INJECTION, SOLUTION INTRAVENOUS at 12:55:00

## 2025-06-19 RX ADMIN — DEXAMETHASONE SODIUM PHOSPHATE 4 MG: 4 MG/ML VIAL (ML) INJECTION at 12:45:00

## 2025-06-19 RX ADMIN — PHENYLEPHRINE HCL 50 MCG: 10 MG/ML VIAL (ML) INJECTION at 13:22:00

## 2025-06-19 RX ADMIN — GLYCOPYRROLATE 0.2 MG: 0.2 INJECTION, SOLUTION INTRAMUSCULAR; INTRAVENOUS at 12:45:00

## 2025-06-19 RX ADMIN — SODIUM CHLORIDE, SODIUM LACTATE, POTASSIUM CHLORIDE, CALCIUM CHLORIDE: 600; 310; 30; 20 INJECTION, SOLUTION INTRAVENOUS at 12:43:00

## 2025-06-19 RX ADMIN — LIDOCAINE HYDROCHLORIDE 50 MG: 10 INJECTION, SOLUTION EPIDURAL; INFILTRATION; INTRACAUDAL; PERINEURAL at 12:47:00

## 2025-06-19 RX ADMIN — ROCURONIUM BROMIDE 50 MG: 10 INJECTION, SOLUTION INTRAVENOUS at 12:49:00

## 2025-06-19 RX ADMIN — PHENYLEPHRINE HCL 50 MCG: 10 MG/ML VIAL (ML) INJECTION at 12:52:00

## 2025-06-19 RX ADMIN — PHENYLEPHRINE HCL 50 MCG: 10 MG/ML VIAL (ML) INJECTION at 12:58:00

## 2025-06-19 RX ADMIN — PHENYLEPHRINE HCL 50 MCG: 10 MG/ML VIAL (ML) INJECTION at 13:20:00

## 2025-06-19 RX ADMIN — SODIUM CHLORIDE, SODIUM LACTATE, POTASSIUM CHLORIDE, CALCIUM CHLORIDE: 600; 310; 30; 20 INJECTION, SOLUTION INTRAVENOUS at 13:33:00

## 2025-06-19 RX ADMIN — PHENYLEPHRINE HCL 50 MCG: 10 MG/ML VIAL (ML) INJECTION at 13:34:00

## 2025-06-19 RX ADMIN — MIDAZOLAM HYDROCHLORIDE 2 MG: 1 INJECTION INTRAMUSCULAR; INTRAVENOUS at 12:43:00

## 2025-06-19 RX ADMIN — ONDANSETRON 4 MG: 2 INJECTION INTRAMUSCULAR; INTRAVENOUS at 12:45:00

## 2025-06-19 RX ADMIN — PHENYLEPHRINE HCL 50 MCG: 10 MG/ML VIAL (ML) INJECTION at 13:17:00

## 2025-06-19 RX ADMIN — PHENYLEPHRINE HCL 50 MCG: 10 MG/ML VIAL (ML) INJECTION at 13:40:00

## 2025-06-19 RX ADMIN — SODIUM CHLORIDE, SODIUM LACTATE, POTASSIUM CHLORIDE, CALCIUM CHLORIDE: 600; 310; 30; 20 INJECTION, SOLUTION INTRAVENOUS at 13:15:00

## 2025-06-19 NOTE — ANESTHESIA POSTPROCEDURE EVALUATION
Patient: Jamal Irving    Procedure Summary       Date: 06/19/25 Room / Location: [facility] MAIN OR 03 / [facility] MAIN OR    Anesthesia Start: 1243 Anesthesia Stop:     Procedure: Septoplasty, bilateral inferior turbinate submucosal resection (Bilateral: Nose) Diagnosis:       Deviated nasal septum      Hypertrophy of nasal turbinates      (Deviated nasal septum [J34.2]Hypertrophy of nasal turbinates [J34.3])    Surgeons: Erik Real DO Anesthesiologist: Galen Davis MD    Anesthesia Type: general ASA Status: 3            Anesthesia Type: general    Vitals Value Taken Time   /59 06/19/25 14:01   Temp 98.4 06/19/25 14:02   Pulse 92 06/19/25 14:02   Resp 13 06/19/25 14:02   SpO2 100 % 06/19/25 14:02   Vitals shown include unfiled device data.    [facility] AN Post Evaluation:   Patient Evaluated in PACU  Patient Participation: complete - patient participated  Level of Consciousness: awake and alert  Pain Score: 0  Pain Management: adequate  Airway Patency:patent  Dental exam unchanged from preop  Yes    Nausea/Vomiting: none  Cardiovascular Status: acceptable  Respiratory Status: acceptable  Postoperative Hydration acceptable      JOSE A BARILLAS CRNA  6/19/2025 2:02 PM

## 2025-06-19 NOTE — H&P
[place]  OTOLARYNGOLOGY - HEAD & NECK SURGERY    5/7/2025     Reason for Consultation:   Nasal congestion    History of Present Illness:     History of Present Illness  Jamal Irving is a 34 year old male with allergic rhinitis who presents with chronic nasal congestion.    He has experienced chronic nasal congestion for many years, predominantly on the left side, associated with a history of allergic rhinitis and a confirmed allergy to dust mites.    He has attempted various allergy medications, including fluticasone nasal spray and oral antihistamines, which have provided approximately 30-40% improvement in symptoms. Despite this, significant nasal congestion persists.    The nasal congestion occasionally disrupts his sleep. He has not undergone any previous nasal surgery.    INTERVAL HISTORY  6/19/2025: Patient is here today for septoplasty and BITR, no changes from previous visit    Past Medical History  Past Medical History[1]    Past Surgical History  Past Surgical History[2]    Family History  Family History[3]    Social History  Pediatric History   Patient Parents    Denzel Irving (Father)     Other Topics Concern    Caffeine Concern No    Exercise No    Seat Belt No    Special Diet No    Stress Concern Yes    Weight Concern Yes     Comment: Underweight   Social History Narrative    Not on file           Current Medications:  Current Medications[4]    Allergies  Allergies[5]    Review of Systems:   A comprehensive 10 point review of systems was completed.  Pertinent positives and negatives noted in the the HPI.    Physical Exam:   Blood pressure 110/75, pulse 79, temperature 98.5 °F (36.9 °C), temperature source Oral, resp. rate 16, height 5' 7\" (1.702 m), weight 129 lb (58.5 kg), SpO2 98%.    GENERAL: No acute distress, Comfortable appearing  FACE: HB 1/6, Normal Animation  HEAD: Normocephalic  EYES: EOMI, pupils equil  EARS: Bilateral Auricles Symmetric  NOSE: Nares patent bilaterally  ORAL CAVITY:  Tongue mobile, Oropharynx clear, Floor of mouth clear, Posterior oropharynx normal  NECK: No palpable lymphadenopathy, thyroid not palpable, nontender    PROCEDURE: BILATERAL RIGID NASAL ENDOSCOPY  Bilateral rigid nasal endoscopy (51301) was performed. Verbal consent was obtained from the patient to proceed with rigid nasal endoscopy.  A rigid 4mm 30 degree nasal endoscope was used to examine both nasal cavities. The inferior meatus, inferior turbinate, nasopharynx, middle meatus, middle turbinate, superior meatus, superior turbinate, and sphenoethmoidal recess were examined bilaterally and deemed to be normal, with any exceptions as noted below. At the completion of the procedure the endoscope was removed. The patient tolerated the procedure well. There were no complications.    Findings: The bilateral inferior turbinates were enlarged bilaterally. The Septum was deviated to the left caudally with deviation of the septum off of the nasal crest. The middle meatus was patent bilaterally. There were no obvious masses or polyps noted.      Results:     Laboratory Data:  Lab Results   Component Value Date    WBC 4.7 10/31/2024    HGB 14.8 10/31/2024    HCT 44.9 10/31/2024    .0 10/31/2024    CREATSERUM 1.03 05/19/2025    BUN 21 05/19/2025     05/19/2025    K 4.2 05/19/2025     05/19/2025    CO2 30.0 05/19/2025     (H) 05/19/2025    CA 10.2 05/19/2025    ALB 4.7 10/31/2024    ALKPHO 52 10/31/2024    TP 6.8 10/31/2024    AST 17 10/31/2024    ALT 18 10/31/2024    T4F 1.4 04/16/2025    TSH 2.471 04/16/2025    B12 1,647 (H) 05/19/2025         Imaging:  No results found.    Results         Impression:       ICD-10-CM    1. Deviated nasal septum  J34.2       2. Hypertrophy of nasal turbinates  J34.3       3. Nasal congestion  R09.81       4. Nasal obstruction  J34.89       5. Allergic rhinitis due to pollen, unspecified seasonality  J30.1            Recommendations:  The patient continues to have  nasal congestion and obstruction despite medical therapy. The patient would likely benefit from septoplasty and bilateral inferior turbinate submucosal resection. I have discussed with the risks and benefits of surgery as well as what to expect following surgery. I have additionally provided them a comprehensive handout regarding possible risks, and postoperative care following surgery. They will review the handout, and I have made myself available to answer any questions or concerns prior to surgery.     Pre-op Diagnosis: Deviated nasal septum [J34.2]  Hypertrophy of nasal turbinates [J34.3]    The above referenced H&P was reviewed by Erik Real DO on 6/19/2025, the patient was examined and no significant changes have occurred in the patient's condition since the H&P was performed.  I discussed with the patient and/or legal representative the potential benefits, risks and side effects of this procedure; the likelihood of the patient achieving goals; and potential problems that might occur during recuperation.  I discussed reasonable alternatives to the procedure, including risks, benefits and side effects related to the alternatives and risks related to not receiving this procedure.  We will proceed with procedure as planned.      Erik Real DO   Otolaryngology/Rhinology, Sinus, and Endoscopic Skull Base Surgery  [address] [place] [address]  Phone [phone]  Fax [phone]  5/6/2025  10:33 PM  5/7/2025     Abridge tool was used for dictation purposes only and the patient was not recorded at any point during the visit.          [1]   Past Medical History:   ALCOHOL USE    Quit in 2018    Allergic rhinitis    Anemia    Anxiety    Attention deficit hyperactivity disorder (ADHD)    Depression    Diabetes (HCC)    Esophageal reflux    High cholesterol    Hyperlipidemia   [2]   Past Surgical History:  Procedure Laterality Date    Upper gi  endoscopy,exam     [3]   Family History  Problem Relation Age of Onset    Hyperlipidemia Mother     Depression Mother     Fibromyalgia Mother     Anemia Mother         On iron supplement    Hypertension Mother         On Ramipril and Eplerenone    Lipids Mother         High triglycerides as well. On Atorvastatin, Ezetimibe, Vascepa    Musculo-skelatal Disorder Mother         Fibromyalgia - On Milnacipran.    Obesity Mother         Accompanied with sleep apnea    Psychiatric Mother         ADHD, Obsessive Compulsive Disorder    Hyperlipidemia Father     Anxiety Father     Heart Disease Father     Diabetes Father         On Metformin, Empagliflozin, Semaglutide/Ozempic.    Bleeding Disorders Father         Low platelets - ITP    Hypertension Father         On Ramipril and Metoprolol    Lipids Father         On Rosuvastatin, Ezetimibe, Vascepa.    Psychiatric Father         Generalized Anxiety Disorder - On Sertraline, Aripiprazole, and Pregabalin.    Anemia Father         On iron supplement    Heart Disorder Father         Coronary Artery Disease. Triple bypass surgery in 2025. Calcium score above 2200    Depression Sister         On Levomilnacipran    Psychiatric Sister         ADHD - stimulant medication    Lipids Sister     Musculo-skelatal Disorder Sister     Asthma Maternal Grandmother     Obesity Maternal Grandmother     Stroke Paternal Grandfather    [4]   No current outpatient medications on file.   [5]   Allergies  Allergen Reactions    Bupropion ANXIETY and Tightness in Chest    Clonidine CONFUSION, DIZZINESS and FATIGUE    Omeprazole NAUSEA AND VOMITING    Propranolol DIZZINESS     Disorientation    Atomoxetine OTHER (SEE COMMENTS)     Increases liver enzymes    Carbamazepine OTHER (SEE COMMENTS)     Fatigue    Citalopram OTHER (SEE COMMENTS)     Not effective  Fatigue    Clopidogrel OTHER (SEE COMMENTS)     Has a genetic mutation, that makes medication ineffective.    Desipramine OTHER (SEE COMMENTS)      Excessive sleepiness    Methylphenidate OTHER (SEE COMMENTS)     headaches    Oxcarbazepine OTHER (SEE COMMENTS)     Was told by doctor to avoid it    Thiothixene OTHER (SEE COMMENTS)    Vilazodone OTHER (SEE COMMENTS)    Escitalopram FATIGUE and INSOMNIA    Fluoxetine FATIGUE    Paroxetine FATIGUE    Sertraline FATIGUE    Venlafaxine FATIGUE

## 2025-06-19 NOTE — ANESTHESIA PREPROCEDURE EVALUATION
Anesthesia PreOp Note    HPI:     Jamal Irving is a 34 year old male who presents for preoperative consultation requested by: Erik Real DO    Date of Surgery: 6/19/2025    Procedure(s):  Septoplasty, bilateral inferior turbinate submucosal resection  Indication: Deviated nasal septum [J34.2]  Hypertrophy of nasal turbinates [J34.3]    Relevant Problems   No relevant active problems       NPO:                         History Review:  Patient Active Problem List    Diagnosis Date Noted    Deviated nasal septum 05/19/2025    Gastroesophageal reflux disease 05/19/2025    Allergy to dust 04/16/2025    Mite allergy 04/16/2025    Inappropriate sinus tachycardia (HCC) 12/03/2024    Rapid palpitations 12/03/2024    Lightheadedness 12/03/2024    Dry skin dermatitis 11/21/2024    Eye pressure 11/21/2024    Tachycardia 04/17/2024    Mixed hyperlipidemia 02/10/2023    Type 2 diabetes mellitus without complication, without long-term current use of insulin (HCC) 02/10/2023    Hair thinning 05/27/2022    Intrinsic eczema 01/16/2022    Anxiety disorder 08/01/2018    Depression 10/29/2014    Attention deficit hyperactivity disorder (ADHD) 12/14/2009       Past Medical History[1]    Past Surgical History[2]    Prescriptions Prior to Admission[3]  Current Medications and Prescriptions Ordered in Epic[4]    Allergies[5]    Family History[6]  Social Hx on file[7]    Available pre-op labs reviewed.     Lab Results   Component Value Date     05/19/2025    K 4.2 05/19/2025     05/19/2025    CO2 30.0 05/19/2025    BUN 21 05/19/2025    CREATSERUM 1.03 05/19/2025     (H) 05/19/2025    CA 10.2 05/19/2025          Vital Signs:  Body mass index is 20.36 kg/m².   height is 1.702 m (5' 7\") and weight is 59 kg (130 lb).   Vitals:    06/17/25 1629   Weight: 59 kg (130 lb)   Height: 1.702 m (5' 7\")        Anesthesia Evaluation     Patient summary reviewed and Nursing notes reviewed    No history of anesthetic  complications   Airway   Mallampati: II  TM distance: >3 FB  Dental      Pulmonary - negative ROS and normal exam   Cardiovascular - negative ROS and normal exam  Exercise tolerance: good    ROS comment: Sinus tachycardia:   Patient with h/o elevated heart rate for years with recurrent episodes of palpitations with increased heart rate up to 180s during his regular exercising, associated with dizziness and lightheadedness.   Evaluated by EP started on BB lead to stress Echo    Stress Echo   CONCLUSIONS:   -Negative stress echocardiogram for ischemia at 13.4 METS 103 % of PMHR.   The resting baseline ejection fraction was estimated at 60 to 65%.  -In summary, there is no clinical, electrocardiographic or echocardiographic evidence of exercise-induced myocardial ischemia.         Neuro/Psych    (+)  anxiety/panic attacks,  attention deficit hyperactivity disorder, depression      GI/Hepatic/Renal    (+) GERD well controlled    Endo/Other    (+) diabetes mellitus type 2 well controlled, hypothyroidism    Comments: DM on Trulicity - held for 3 days  Abdominal  - normal exam                 Anesthesia Plan:   ASA:  3  Plan:   General  Airway:  ETT  Post-op Pain Management: IV analgesics  Informed Consent Plan and Risks Discussed With:  Patient  Discussed plan with:  Surgeon and CRNA  Provider Attestation (if preop done by other):  Plan as outlined      I have informed Jamal S Ally and/or legal guardian or family member of the nature of the anesthetic plan, benefits, risks including possible dental damage if relevant, major complications, and any alternative forms of anesthetic management.   All of the patient's questions were answered to the best of my ability. The patient desires the anesthetic management as planned.  Holger Samson MD  6/19/2025 8:44 AM  Present on Admission:  **None**           [1]   Past Medical History:   ALCOHOL USE    Quit in 2018    Allergic rhinitis    Anemia    Anxiety    Attention deficit  hyperactivity disorder (ADHD)    Depression    Diabetes (HCC)    Esophageal reflux    High cholesterol    Hyperlipidemia   [2]   Past Surgical History:  Procedure Laterality Date    Upper gi endoscopy,exam     [3]   No medications prior to admission.   [4]   No current Epic-ordered facility-administered medications on file.     Current Outpatient Medications Ordered in Epic   Medication Sig Dispense Refill    ezetimibe 10 MG Oral Tab Take 1 tablet (10 mg total) by mouth Every afternoon at 2:00 pm.      famotidine 20 MG Oral Tab Take 1 tablet (20 mg total) by mouth daily as needed for Heartburn.      Methylcellulose, Laxative, 500 MG Oral Tab Every afternoon at 2:00 pm.      Multiple Vitamins-Minerals (MULTIVITAMIN ADULT, MINERALS,) Oral Tab Every afternoon at 2:00 pm.      polyethylene glycol, PEG 3350, 17 GM/SCOOP Oral Powder Every afternoon at 2:00 pm.      Probiotic Product (PROBIOTIC 10 ULTRA STRENGTH) Oral Cap       Dulaglutide (TRULICITY) 1.5 MG/0.5ML Subcutaneous Solution Auto-injector Inject 1.5 mg into the skin once a week. 6 mL 0    rosuvastatin 10 MG Oral Tab Take 1 tablet (10 mg total) by mouth daily. (Patient taking differently: Take 1 tablet (10 mg total) by mouth Every afternoon at 2:00 pm.) 90 tablet 1    desloratadine 5 MG Oral Tab Take 1 tablet (5 mg total) by mouth daily. (Patient taking differently: Take 1 tablet (5 mg total) by mouth Every afternoon at 2:00 pm.) 90 tablet 1    ARIPiprazole 2 MG Oral Tab Take 1 tablet (2 mg total) by mouth Every afternoon at 2:00 pm.      azelastine 137 MCG/SPRAY Nasal Solution       busPIRone 15 MG Oral Tab at noon and before bedtime.      Ferrous Bisglycinate Chelate 28 MG Oral Cap Every afternoon at 2:00 pm.      fluticasone propionate 50 MCG/ACT Nasal Suspension 2 sprays by Each Nare route daily. 11.1 mL 1    JARDIANCE 25 MG Oral Tab Take 1 tablet (25 mg total) by mouth daily. 90 tablet 0    omega-3-acid ethyl esters 1 g Oral Cap Take 2 capsules (2 g total)  by mouth 2 (two) times daily. (Patient taking differently: Take 2 capsules (2 g total) by mouth 2 (two) times daily. Taking it once a day) 360 capsule 0    bisoprolol 5 MG Oral Tab Take 1 tablet (5 mg total) by mouth daily. (Patient taking differently: Take 1 tablet (5 mg total) by mouth Every afternoon at 2:00 pm.) 90 tablet 1    triamcinolone 0.1 % External Ointment Apply 1 Application topically in the morning and 1 Application before bedtime.      Dupilumab 300 MG/2ML Subcutaneous Solution Auto-injector Inject 300 mg into the skin every 14 (fourteen) days.      Fluocinolone Acetonide Scalp 0.01 % External Oil Apply 1 Application topically daily. (Patient taking differently: Apply 1 Application topically as needed.)      Amphetamine-Dextroamphet ER 20 MG Oral Capsule SR 24 Hr Take 1 capsule (20 mg total) by mouth Every afternoon at 2:00 pm.      lisdexamfetamine (VYVANSE) 50 MG Oral Cap Take 1 capsule (50 mg total) by mouth daily. (Patient taking differently: Take 1 capsule (50 mg total) by mouth Every afternoon at 2:00 pm.) 30 capsule 0    clobetasol 0.05 % External Ointment Apply topically 2 (two) times daily as needed.      amphetamine-dextroamphetamine 10 MG Oral Tab Take by mouth as needed.      FETZIMA 120 MG Oral Capsule SR 24 Hr Take 120 mg by mouth daily. (Patient taking differently: Take 120 mg by mouth Every afternoon at 2:00 pm.) 30 capsule 0    Creatine Monohydrate 1.5 GM/15ML Oral Liquid Every afternoon at 2:00 pm.      ONETOUCH VERIO In Vitro Strip USE TO TEST BLOOD SUGAR ONCE DAILY AS DIRECTED 100 strip 0    Lancets (ONETOUCH DELICA PLUS [id number]) Does not apply Misc USE TO TEST BLOOD SUGAR ONCE DAILY 100 each 0    Blood Glucose Monitoring Suppl (ONETOUCH VERIO REFLECT) w/Device Does not apply Kit       Glucose Blood (ONETOUCH TEST VI)      [5]   Allergies  Allergen Reactions    Bupropion ANXIETY and Tightness in Chest    Clonidine CONFUSION, DIZZINESS and FATIGUE    Omeprazole NAUSEA AND VOMITING     Propranolol DIZZINESS     Disorientation    Atomoxetine OTHER (SEE COMMENTS)     Increases liver enzymes    Carbamazepine OTHER (SEE COMMENTS)     Fatigue    Citalopram OTHER (SEE COMMENTS)     Not effective  Fatigue    Clopidogrel OTHER (SEE COMMENTS)     Has a genetic mutation, that makes medication ineffective.    Desipramine OTHER (SEE COMMENTS)     Excessive sleepiness    Methylphenidate OTHER (SEE COMMENTS)     headaches    Oxcarbazepine OTHER (SEE COMMENTS)     Was told by doctor to avoid it    Thiothixene OTHER (SEE COMMENTS)    Vilazodone OTHER (SEE COMMENTS)    Escitalopram FATIGUE and INSOMNIA    Fluoxetine FATIGUE    Paroxetine FATIGUE    Sertraline FATIGUE    Venlafaxine FATIGUE   [6]   Family History  Problem Relation Age of Onset    Hyperlipidemia Mother     Depression Mother     Fibromyalgia Mother     Anemia Mother         On iron supplement    Hypertension Mother         On Ramipril and Eplerenone    Lipids Mother         High triglycerides as well. On Atorvastatin, Ezetimibe, Vascepa    Musculo-skelatal Disorder Mother         Fibromyalgia - On Milnacipran.    Obesity Mother         Accompanied with sleep apnea    Psychiatric Mother         ADHD, Obsessive Compulsive Disorder    Hyperlipidemia Father     Anxiety Father     Heart Disease Father     Diabetes Father         On Metformin, Empagliflozin, Semaglutide/Ozempic.    Bleeding Disorders Father         Low platelets - ITP    Hypertension Father         On Ramipril and Metoprolol    Lipids Father         On Rosuvastatin, Ezetimibe, Vascepa.    Psychiatric Father         Generalized Anxiety Disorder - On Sertraline, Aripiprazole, and Pregabalin.    Anemia Father         On iron supplement    Heart Disorder Father         Coronary Artery Disease. Triple bypass surgery in 2025. Calcium score above 2200    Depression Sister         On Levomilnacipran    Psychiatric Sister         ADHD - stimulant medication    Lipids Sister     Musculo-skelatal  Disorder Sister     Asthma Maternal Grandmother     Obesity Maternal Grandmother     Stroke Paternal Grandfather    [7]   Social History  Socioeconomic History    Marital status: Single   Tobacco Use    Smoking status: Never     Passive exposure: Never    Smokeless tobacco: Never   Vaping Use    Vaping status: Never Used   Substance and Sexual Activity    Alcohol use: Not Currently     Comment: quit 6 years ago    Drug use: Never   Other Topics Concern    Caffeine Concern No    Exercise No    Seat Belt No    Special Diet No    Stress Concern Yes    Weight Concern Yes     Comment: Underweight

## 2025-06-19 NOTE — OPERATIVE REPORT
OTOLARYNGOLOGY/HEAD & NECK SURGERY  OPERATIVE REPORT    Patient Name: Jamal Irving     Date of Surgery: 6/19/2025      Attending Surgeon: Erik Real DO     Anesthesia type: General     PREOPERATIVE DIAGNOSIS:   Septal deviation to the left  Bilateral Inferior turbinate hypertrophy.     POSTOPERATIVE DIAGNOSIS:   Septal deviation to the left  Bilateral Inferior turbinate hypertrophy.     OPERATION:   Endoscopic Septoplasty (46961)  Bilateral inferior turbinate submucous resection (70223-56)    OPERATIVE FINDINGS:   Nasal septal deviation to the left which is severe with displacement of the septal cartilage off to the left of the crest as well as a large posterior bony spur abutting the inferior turbinate  Bilateral Inferior Turbinate Hypertrophy    DESCRIPTION OF PROCEDURE:     The patient was brought to the operating room after all risks and benefits of the procedure were explained and consent was obtained. The patient was positively identified by the attending surgeon and was brought into the Operating Room and placed in the supine position. After induction of general anesthesia, the patient was orotracheally intubated and the tube was secured on the left side. All pressure points were carefully padded and a foam donut was placed underneath the head.  The eyes were protected with tape and visible at all times during the surgery. Nasal pledgets soaked in topical adrenaline 1:1000 were inserted into the bilateral nasal cavities for decongestion.  Intravenous antibiotics were administered. The patient was drapped in the typical fashion for endoscopic sinus surgery with exposure of the eyes for continuous monitoring.  A time-out was then performed.    The pledgets were removed and the nasal cavities were examined with a 30-degree rigid Storz endoscope.  The nasal septum was noted to be severely deviated to the left with displacement of the septal cartilage off to the left of the crest as well as a large  posterior bony spur abutting the inferior turbinate.  The bilateral inferior turbinates were noted to be enlarged.    We began with the submucous resection of the bilateral inferior turbinates.  The bilateral nasal cavities were then infiltrated with 1% lidocaine with 1:100,000 of epinephrine solution.  Starting on the right side, a 45 degree through cut was used to resect the area of the head of the inferior turbinate in its axilla. Following this, the most inferior and lateral portion of the soft tissue coverage of the inferior turbinate was resected. Soft tissue component was elevated in a subperiosteal layer using a Vicky elevator. A straight Darien-Cut forceps was used to resect the most anterior portions of the bony inferior turbinate. Hemostasis was obtained using suction cautery, and the turbinate was lateralized using a Grissom elevator. The same procedure was performed on the left side following the septoplasty which is dictated below    Next, we turned our attention to the nasal septum. The bilateral nasal septum was infiltrated with 1% lidocaine with 1:100,000 of epinephrine solution. An incision was then made on the left side inferior to the large inferior septal spur. This was brought also anterior to the deflected portion of the caudal septum. We then elevated the sub-mucoperichondrial flap on that side exposing the entirety of the deviated portion of the septum. Next, a vertical incision was made into the quadrangular cartilage, and the contralateral flap was elevated. All deviated portion of the cartilage was resected using a straight Blakesley forceps. There was a large left sided inferior spur that was coming from the maxillary crest which was resected. Hemostasis was then achieved using a suction cautery.     The septal flaps were then approximated using a 4-0 plain gut. Two Cui splints were then placed, 1 in each nasal cavity, lateral to the middle turbinate. The Cui splints were covered with  bacitracin solution. The Cui splints were secured to the membranous columella using a 2-0 silk suture. The patient was then rotated back to the Anesthesia Team and was awakened and extubated having tolerated the procedure well, and then transferred to the PACU in stable condition.     Specimens: None     Complications: None     Estimated Blood Loss: 25 mL     Disposition: PACU     Condition: Stable

## 2025-06-19 NOTE — ANESTHESIA PROCEDURE NOTES
Airway  Date/Time: 6/19/2025 12:49 PM  Reason: elective    Airway not difficult    General Information and Staff   Patient location during procedure: OR  Resident/CRNA: [place] Amaya CRNA  Performed: CRNA   Performed by: [place] Amaya CRNA  Authorized by: Galen Davis MD        Indications and Patient Condition  Indications for airway management: anesthesia  Sedation level: deep      Preoxygenated: yesPatient position: sniffing  MILS maintained throughout    Mask difficulty assessment: 1 - vent by mask  No planned trial extubation    Final Airway Details    Final airway type: endotracheal airway    Successful airway: ETT  Cuffed: yes   Successful intubation technique: direct laryngoscopy  Facilitating devices/methods: cricoid pressure  Endotracheal tube insertion site: oral  Blade: Ricardo  Blade size: #4  ETT size (mm): 7.5    Cormack-Lehane Classification: grade IIA - partial view of glottis  Placement verified by: capnometry   Cuff volume (mL): 6  Measured from: lips  ETT to lips (cm): 21  Number of attempts at approach: 1  Number of other approaches attempted: 0

## 2025-06-20 ENCOUNTER — TELEPHONE (OUTPATIENT)
Dept: OTOLARYNGOLOGY | Facility: CLINIC | Age: 35
End: 2025-06-20

## 2025-06-23 NOTE — TELEPHONE ENCOUNTER
Pt post op day #4 septoplasty. Pt states doing okay after surgery. RN informed pt of post op instructions:     You will have plastic splints inside of your nose with a suture holding it in place. This reduces bleeding and helps with healing. Any splints or packing will be removed a week after surgery.     You may also have bandages (dressings) on the outside of your nose for the first 3-5 days after surgery It’s normal to have some mucus and blood drain from your nose. Until packing is removed, you may have to breathe through your mouth.     Avoid blowing your nose for 2 weeks after surgery, and if you have to sneeze, do so with your mouth open   In a few days, the inside of your nose may swell. Or a scab or crust may make it hard to breathe through your nose again. Leave the scab alone. Your provider will remove it during a follow up visit. Using saline (irrigation or aerosol) regularly after surgery helps to reduce the amount of crusting at each visit     You may have some swelling or bruising around your eyes, although this is very rare.    Expect some throat dryness and irritation. Drink plenty of fluids.    You will likely have some numbness of the upper teeth and gums which is expected. This may take up to 3 months to return to normal. You may have minor numbness, pain, swelling, and a little stiffness under the tip of the nose.     Pain medicine will be prescribed as needed. Do not drive on pain medication, expect dizzy/sleepyness    You will be prescribed Afrin nasal spray (Use 2 sprays in each nostril, twice daily for the first 3 days after surgery)    You will be prescribed nasal saline rinses (We recommend NeMuufri Sinus Rinse bottle with saline packets; PLEASE USE DISTILLED WATER; You will start this on day 4 after surgery, and continue until you are told to stop by your surgeon)  You will also be prescribed antibiotics to take for 7-10 days after surgery  You will be required to follow up on weeks 1, 2,  4, 8, and 20, after surgery for nose cleanings and to ensure healing, as well as to manage you nasal medications for long lasting results.  You will then be seen annually from that point    Pt scheduled 06/26/2025. RN informed Dr. Real of double booking.

## 2025-06-26 ENCOUNTER — OFFICE VISIT (OUTPATIENT)
Dept: OTOLARYNGOLOGY | Facility: CLINIC | Age: 35
End: 2025-06-26
Payer: MEDICAID

## 2025-06-26 DIAGNOSIS — J30.1 ALLERGIC RHINITIS DUE TO POLLEN, UNSPECIFIED SEASONALITY: ICD-10-CM

## 2025-06-26 DIAGNOSIS — J34.89 NASAL OBSTRUCTION: ICD-10-CM

## 2025-06-26 DIAGNOSIS — J34.3 HYPERTROPHY OF NASAL TURBINATES: ICD-10-CM

## 2025-06-26 DIAGNOSIS — R09.81 NASAL CONGESTION: ICD-10-CM

## 2025-06-26 DIAGNOSIS — J34.2 DEVIATED NASAL SEPTUM: Primary | ICD-10-CM

## 2025-06-26 PROCEDURE — 31237 NSL/SINS NDSC SURG BX POLYPC: CPT | Performed by: STUDENT IN AN ORGANIZED HEALTH CARE EDUCATION/TRAINING PROGRAM

## 2025-06-26 PROCEDURE — 99024 POSTOP FOLLOW-UP VISIT: CPT | Performed by: STUDENT IN AN ORGANIZED HEALTH CARE EDUCATION/TRAINING PROGRAM

## 2025-06-26 NOTE — PROGRESS NOTES
The following individual(s) verbally consented to be recorded using ambient AI listening technology and understand that they can each withdraw their consent to this listening technology at any point by asking the clinician to turn off or pause the recording: yes patient consents     Patient name: Jamal Irving

## 2025-06-26 NOTE — PROGRESS NOTES
[facility]  OTOLARYNGOLOGY - HEAD & NECK SURGERY    6/26/2025     Reason for Consultation:     Chief Complaint   Patient presents with    Post-Op     Patient is here due to septoplasty post op 6/19/25. Reports congestion, denies pain          History of Present Illness:     History of Present Illness  Jamal Irving is a 34 year old male with allergic rhinitis who presents with chronic nasal congestion.    He has experienced chronic nasal congestion for many years, predominantly on the left side, associated with a history of allergic rhinitis and a confirmed allergy to dust mites.    He has attempted various allergy medications, including fluticasone nasal spray and oral antihistamines, which have provided approximately 30-40% improvement in symptoms. Despite this, significant nasal congestion persists.    The nasal congestion occasionally disrupts his sleep. He has not undergone any previous nasal surgery.      6/26/2025    Jamal Irving is a 34 year old male who presents for removal of nasal splints following septoplasty.    He experiences nasal congestion and difficulty sleeping, leading to significant fatigue over the past week. He remains mostly in bed due to tiredness. No snoring is present. Post-surgery, he developed a cough, which he associates with sinus issues. Sudafed provides immediate relief for the cough. Initially, he experienced constant coughing after surgery.    Past Medical History  Past Medical History[1]    Past Surgical History  Past Surgical History[2]    Family History  Family History[3]    Social History  Pediatric History   Patient Parents    Denzel Irving (Father)     Other Topics Concern    Caffeine Concern No    Exercise No    Seat Belt No    Special Diet No    Stress Concern Yes    Weight Concern Yes     Comment: Underweight   Social History Narrative    Not on file           Current Medications:  Current Medications[4]    Allergies  Allergies[5]    Review of Systems:   A comprehensive  10 point review of systems was completed.  Pertinent positives and negatives noted in the the HPI.    Physical Exam:   There were no vitals taken for this visit.    GENERAL: No acute distress, Comfortable appearing  FACE: HB 1/6, Normal Animation  HEAD: Normocephalic  EYES: EOMI, pupils equil  EARS: Bilateral Auricles Symmetric  NOSE: Nares patent bilaterally  ORAL CAVITY: Tongue mobile, Oropharynx clear, Floor of mouth clear, Posterior oropharynx normal  NECK: No palpable lymphadenopathy, thyroid not palpable, nontender    PROCEDURE: BILATERAL RIGID NASAL ENDOSCOPY WITH DEBRIDEMENT  Bilateral rigid nasal endoscopy with debridement (78776) was performed after nasal surgery in order to prevent infection, promote healing, and improve the nasal airway. Verbal consent was obtained from the patient to proceed with rigid nasal endoscopy. The nasal cavity was decongested and topically anesthetized with a combination of Oxymetazoline and 4% Lidocaine. A rigid 4mm 30 degree nasal endoscope connected to a high-definition video endoscopy system was used to examine both nasal cavities. The inferior meatus, inferior turbinate, nasopharynx, middle meatus, middle turbinate, superior meatus, superior turbinate, and sphenoethmoidal recess were examined bilaterally, with any exceptions as noted below. At the completion of the procedure the endoscope was removed. The patient tolerated the procedure well. There were no complications.    Findings: Crusting, old clots, and necrotic tissue were removed endoscopically using a combination of Blakesley Forceps and Straight Suction to prevent infection, promote healing, and improve the nasal airway. The bilateral inferior turbinates were reduced and healing well with clot and fibrinous tissue removed from the inferior aspect and the axilla bilaterally. The Septum was midline and healing quite well. The middle meatus was patent bilaterally. There were no obvious masses or polyps  noted.      Results:     Laboratory Data:  Lab Results   Component Value Date    WBC 4.7 10/31/2024    HGB 14.8 10/31/2024    HCT 44.9 10/31/2024    .0 10/31/2024    CREATSERUM 1.03 05/19/2025    BUN 21 05/19/2025     05/19/2025    K 4.2 05/19/2025     05/19/2025    CO2 30.0 05/19/2025     (H) 05/19/2025    CA 10.2 05/19/2025    ALB 4.7 10/31/2024    ALKPHO 52 10/31/2024    TP 6.8 10/31/2024    AST 17 10/31/2024    ALT 18 10/31/2024    T4F 1.4 04/16/2025    TSH 2.471 04/16/2025    B12 1,647 (H) 05/19/2025         Imaging:  No results found.         Impression:       ICD-10-CM    1. Deviated nasal septum  J34.2       2. Hypertrophy of nasal turbinates  J34.3       3. Nasal congestion  R09.81       4. Nasal obstruction  J34.89       5. Allergic rhinitis due to pollen, unspecified seasonality  J30.1            Recommendations:     Deviated nasal septum  Post-operative removal of nasal splints following septoplasty. The septum was severely deviated on the left side with a bony spur causing significant obstruction. The septum now appears straight and well-aligned after surgery.  - Continue nasal saline rinses twice daily  - Follow-up in 1 week for repeat endoscopy and debridement    Nasal congestion and obstruction  Nasal congestion and obstruction due to deviated septum and hypertrophy of nasal turbinates. Post-operative improvement expected after splint removal. Initial feeling of increased airflow noted. Anticipate some swelling and crusting as healing progresses.  - Continue nasal rinses twice daily  - Monitor for swelling and crusting as healing progresses          Thank you for allowing me to participate in the care of your patient.    Erik Real,    Otolaryngology/Rhinology, Sinus, and Endoscopic Skull Base Surgery  [facility] [address]  Phone [phone]  Fax [phone]  5/6/2025  10:33 PM  6/26/2025     Camila  tool was used for dictation purposes only and the patient was not recorded at any point during the visit.          [1]   Past Medical History:   ALCOHOL USE    Quit in 2018    Allergic rhinitis    Anemia    Anxiety    Attention deficit hyperactivity disorder (ADHD)    Depression    Diabetes (HCC)    Esophageal reflux    High cholesterol    Hyperlipidemia   [2]   Past Surgical History:  Procedure Laterality Date    Upper gi endoscopy,exam     [3]   Family History  Problem Relation Age of Onset    Hyperlipidemia Mother     Depression Mother     Fibromyalgia Mother     Anemia Mother         On iron supplement    Hypertension Mother         On Ramipril and Eplerenone    Lipids Mother         High triglycerides as well. On Atorvastatin, Ezetimibe, Vascepa    Musculo-skelatal Disorder Mother         Fibromyalgia - On Milnacipran.    Obesity Mother         Accompanied with sleep apnea    Psychiatric Mother         ADHD, Obsessive Compulsive Disorder    Hyperlipidemia Father     Anxiety Father     Heart Disease Father     Diabetes Father         On Metformin, Empagliflozin, Semaglutide/Ozempic.    Bleeding Disorders Father         Low platelets - ITP    Hypertension Father         On Ramipril and Metoprolol    Lipids Father         On Rosuvastatin, Ezetimibe, Vascepa.    Psychiatric Father         Generalized Anxiety Disorder - On Sertraline, Aripiprazole, and Pregabalin.    Anemia Father         On iron supplement    Heart Disorder Father         Coronary Artery Disease. Triple bypass surgery in 2025. Calcium score above 2200    Depression Sister         On Levomilnacipran    Psychiatric Sister         ADHD - stimulant medication    Lipids Sister     Musculo-skelatal Disorder Sister     Asthma Maternal Grandmother     Obesity Maternal Grandmother     Stroke Paternal Grandfather    [4]   Current Outpatient Medications   Medication Sig Dispense Refill    amoxicillin clavulanate 875-125 MG Oral Tab Take 1 tablet by mouth  every 12 (twelve) hours for 7 days. 14 tablet 0    oxymetazoline 0.05 % Nasal Solution 2 sprays by Nasal route 2 (two) times daily. each nostril morning and evening. Use for 3 days then stop 30 mL 0    Hypertonic Nasal Wash (SINUS RINSE BOTTLE KIT) Nasal Powd Pack Start on day 4 after surgery. Please use the Neilmed Sinus Rinse Squeeze Bottle. Fill the bottle to the dotted line with distilled or appropriately filtered water (DO NOT USE TAP WATER). Add in 1 Neilmed saline powder pouch. Lean over a sink and tilt your head down. Irrigate each nasal cavity with half of the bottle. Do this 2 times a day until you are told to stop. 1 each 0    Hypertonic Nasal Wash (SINUS RINSE KIT) Nasal Powd Pack Start on day 4 after surgery. Please use the Neilmed Sinus Rinse Squeeze Bottle. Fill the bottle to the dotted line with distilled or appropriately filtered water (DO NOT USE TAP WATER). Add in 1 Neilmed saline powder pouch. Lean over a sink and tilt your head down. Irrigate each nasal cavity with half of the bottle. Do this 2 times a day until you are told to stop. 50 each 2    ezetimibe 10 MG Oral Tab Take 1 tablet (10 mg total) by mouth Every afternoon at 2:00 pm.      famotidine 20 MG Oral Tab Take 1 tablet (20 mg total) by mouth daily as needed for Heartburn.      Creatine Monohydrate 1.5 GM/15ML Oral Liquid Every afternoon at 2:00 pm.      Methylcellulose, Laxative, 500 MG Oral Tab Every afternoon at 2:00 pm.      Multiple Vitamins-Minerals (MULTIVITAMIN ADULT, MINERALS,) Oral Tab Every afternoon at 2:00 pm.      polyethylene glycol, PEG 3350, 17 GM/SCOOP Oral Powder Every afternoon at 2:00 pm.      Probiotic Product (PROBIOTIC 10 ULTRA STRENGTH) Oral Cap       ONETOUCH VERIO In Vitro Strip USE TO TEST BLOOD SUGAR ONCE DAILY AS DIRECTED 100 strip 0    Dulaglutide (TRULICITY) 1.5 MG/0.5ML Subcutaneous Solution Auto-injector Inject 1.5 mg into the skin once a week. 6 mL 0    rosuvastatin 10 MG Oral Tab Take 1 tablet (10 mg  total) by mouth daily. (Patient taking differently: Take 1 tablet (10 mg total) by mouth Every afternoon at 2:00 pm.) 90 tablet 1    desloratadine 5 MG Oral Tab Take 1 tablet (5 mg total) by mouth daily. (Patient taking differently: Take 1 tablet (5 mg total) by mouth Every afternoon at 2:00 pm.) 90 tablet 1    ARIPiprazole 2 MG Oral Tab Take 1 tablet (2 mg total) by mouth Every afternoon at 2:00 pm.      azelastine 137 MCG/SPRAY Nasal Solution       busPIRone 15 MG Oral Tab at noon and before bedtime.      Ferrous Bisglycinate Chelate 28 MG Oral Cap Every afternoon at 2:00 pm.      fluticasone propionate 50 MCG/ACT Nasal Suspension 2 sprays by Each Nare route daily. 11.1 mL 1    JARDIANCE 25 MG Oral Tab Take 1 tablet (25 mg total) by mouth daily. 90 tablet 0    omega-3-acid ethyl esters 1 g Oral Cap Take 2 capsules (2 g total) by mouth 2 (two) times daily. (Patient taking differently: Take 2 capsules (2 g total) by mouth 2 (two) times daily. Taking it once a day) 360 capsule 0    Lancets (ONETOUCH DELICA PLUS [id number]) Does not apply Misc USE TO TEST BLOOD SUGAR ONCE DAILY 100 each 0    bisoprolol 5 MG Oral Tab Take 1 tablet (5 mg total) by mouth daily. (Patient taking differently: Take 1 tablet (5 mg total) by mouth Every afternoon at 2:00 pm.) 90 tablet 1    triamcinolone 0.1 % External Ointment Apply 1 Application topically in the morning and 1 Application before bedtime.      Dupilumab 300 MG/2ML Subcutaneous Solution Auto-injector Inject 300 mg into the skin every 14 (fourteen) days.      Fluocinolone Acetonide Scalp 0.01 % External Oil Apply 1 Application topically daily. (Patient taking differently: Apply 1 Application topically as needed.)      Blood Glucose Monitoring Suppl (ONETOUCH VERIO REFLECT) w/Device Does not apply Kit       Amphetamine-Dextroamphet ER 20 MG Oral Capsule SR 24 Hr Take 1 capsule (20 mg total) by mouth Every afternoon at 2:00 pm.      Glucose Blood (ONETOUCH TEST VI)        lisdexamfetamine (VYVANSE) 50 MG Oral Cap Take 1 capsule (50 mg total) by mouth daily. (Patient taking differently: Take 1 capsule (50 mg total) by mouth Every afternoon at 2:00 pm.) 30 capsule 0    clobetasol 0.05 % External Ointment Apply topically 2 (two) times daily as needed.      amphetamine-dextroamphetamine 10 MG Oral Tab Take by mouth as needed.      FETZIMA 120 MG Oral Capsule SR 24 Hr Take 120 mg by mouth daily. (Patient taking differently: Take 120 mg by mouth Every afternoon at 2:00 pm.) 30 capsule 0    acetaminophen-codeine 300-30 MG Oral Tab Take 2 tablets by mouth every 6 (six) hours as needed for Pain. 30 tablet 0   [5]   Allergies  Allergen Reactions    Bupropion ANXIETY and Tightness in Chest    Clonidine CONFUSION, DIZZINESS and FATIGUE    Omeprazole NAUSEA AND VOMITING    Propranolol DIZZINESS     Disorientation    Atomoxetine OTHER (SEE COMMENTS)     Increases liver enzymes    Carbamazepine OTHER (SEE COMMENTS)     Fatigue    Citalopram OTHER (SEE COMMENTS)     Not effective  Fatigue    Clopidogrel OTHER (SEE COMMENTS)     Has a genetic mutation, that makes medication ineffective.    Desipramine OTHER (SEE COMMENTS)     Excessive sleepiness    Methylphenidate OTHER (SEE COMMENTS)     headaches    Oxcarbazepine OTHER (SEE COMMENTS)     Was told by doctor to avoid it    Thiothixene OTHER (SEE COMMENTS)    Vilazodone OTHER (SEE COMMENTS)    Escitalopram FATIGUE and INSOMNIA    Fluoxetine FATIGUE    Paroxetine FATIGUE    Sertraline FATIGUE    Venlafaxine FATIGUE

## 2025-07-01 DIAGNOSIS — I47.11 INAPPROPRIATE SINUS TACHYCARDIA (HCC): ICD-10-CM

## 2025-07-01 DIAGNOSIS — E11.9 TYPE 2 DIABETES MELLITUS WITHOUT COMPLICATION, WITHOUT LONG-TERM CURRENT USE OF INSULIN (HCC): ICD-10-CM

## 2025-07-02 ENCOUNTER — OFFICE VISIT (OUTPATIENT)
Facility: LOCATION | Age: 35
End: 2025-07-02
Payer: MEDICAID

## 2025-07-02 DIAGNOSIS — J34.3 HYPERTROPHY OF NASAL TURBINATES: ICD-10-CM

## 2025-07-02 DIAGNOSIS — R09.82 POSTNASAL DRIP: ICD-10-CM

## 2025-07-02 DIAGNOSIS — J34.2 DEVIATED NASAL SEPTUM: Primary | ICD-10-CM

## 2025-07-02 PROCEDURE — 31237 NSL/SINS NDSC SURG BX POLYPC: CPT | Performed by: STUDENT IN AN ORGANIZED HEALTH CARE EDUCATION/TRAINING PROGRAM

## 2025-07-02 PROCEDURE — 99024 POSTOP FOLLOW-UP VISIT: CPT | Performed by: STUDENT IN AN ORGANIZED HEALTH CARE EDUCATION/TRAINING PROGRAM

## 2025-07-02 RX ORDER — BISOPROLOL FUMARATE 5 MG/1
5 TABLET, FILM COATED ORAL
Qty: 90 TABLET | Refills: 0 | Status: SHIPPED | OUTPATIENT
Start: 2025-07-02

## 2025-07-02 NOTE — TELEPHONE ENCOUNTER
Hypertension Medications Protocol [id number]/01/2025 10:06 PM   Protocol Details CMP or BMP in past 12 months    Last BP reading less than 140/90    In person appointment or virtual visit in the past 12 mos or appointment in next 3 mos    EGFRCR or GFRNAA > 50    Medication is active on med list          Diabetes Medication Protocol [id number]/01/2025 10:06 PM   Protocol Details Last A1C < 7.5 and within past 6 months    In person appointment or virtual visit in the past 6 mos or appointment in next 3 mos    Microalbumin procedure in past 12 months or taking ACE/ARB    EGFRCR or GFRNAA > 50    GFR in the past 12 months    Medication is active on med list          Cholesterol Medication Protocol [id number]/01/2025 10:06 PM   Protocol Details ALT < 80    ALT resulted within past year    Lipid panel within past 12 months    In person appointment or virtual visit in the past 12 mos or appointment in next 3 mos    Medication is active on med list          Future Appointments   Date Time Provider Department Center   7/16/2025  1:45 PM Erik Real DO [facility]   8/19/2025  1:00 PM Irina Nowak MD [facility]   9/9/2025  2:00 PM Mira Dover MD [facility]     Ezetimibe external medication   OV note from 5/19/25:   # HLD   Did not tolerate atorvastatin in the past  Currently taking Rosuvastatin 10 mg daily, tolerating well.   CT CAC Score 11/25/24: score of 0   Per Cardiology - Patient wishes for aggressive primary prevention, with LDL target <55 and ApoB <60    His sister also has high cholesterol and is on rosuvastatin.    Pt notified through [employer] . Need to clarify with pt .

## 2025-07-02 NOTE — PROGRESS NOTES
[facility]  OTOLARYNGOLOGY - HEAD & NECK SURGERY    7/2/2025     Reason for Consultation:     Chief Complaint   Patient presents with    Follow - Up     Patient is here due to deviated nasal septum follow up, reports still having some tenderness since surgery. Reports slight bleeding when doing nasal rinses          History of Present Illness:     History of Present Illness  Jamal Irving is a 34 year old male with allergic rhinitis who presents with chronic nasal congestion.    He has experienced chronic nasal congestion for many years, predominantly on the left side, associated with a history of allergic rhinitis and a confirmed allergy to dust mites.    He has attempted various allergy medications, including fluticasone nasal spray and oral antihistamines, which have provided approximately 30-40% improvement in symptoms. Despite this, significant nasal congestion persists.    The nasal congestion occasionally disrupts his sleep. He has not undergone any previous nasal surgery.      6/26/2025    Jamal Irving is a 34 year old male who presents for removal of nasal splints following septoplasty.    He experiences nasal congestion and difficulty sleeping, leading to significant fatigue over the past week. He remains mostly in bed due to tiredness. No snoring is present. Post-surgery, he developed a cough, which he associates with sinus issues. Sudafed provides immediate relief for the cough. Initially, he experienced constant coughing after surgery.      Jamal Irving is a 34 year old male who presents for postoperative follow-up after nasal surgery.    He has experienced significant improvement in energy levels and sleep quality over the past week following nasal surgery. He continues to perform nasal rinses as part of his postoperative care.    He describes experiencing significant mucus production, with postnasal drip causing a 'really bad taste' at the back of his throat. Prior to surgery, he had issues with  postnasal drip and nasal congestion, which have improved postoperatively. He continues to use nasal spray once a day, which he had been using prior to surgery for these symptoms.    He mentions the development of an oral ulcer two days ago, confirmed by a dentist. This is his first experience with an ulcer, and he describes it as painful. He initially thought it was a tooth issue.    No fever, headache, or recent illness in himself or those around him.    Past Medical History  Past Medical History[1]    Past Surgical History  Past Surgical History[2]    Family History  Family History[3]    Social History  Pediatric History   Patient Parents    Denzel Irving (Father)     Other Topics Concern    Caffeine Concern No    Exercise No    Seat Belt No    Special Diet No    Stress Concern Yes    Weight Concern Yes     Comment: Underweight   Social History Narrative    Not on file           Current Medications:  Current Medications[4]    Allergies  Allergies[5]    Review of Systems:   A comprehensive 10 point review of systems was completed.  Pertinent positives and negatives noted in the the HPI.    Physical Exam:   There were no vitals taken for this visit.    GENERAL: No acute distress, Comfortable appearing  FACE: HB 1/6, Normal Animation  HEAD: Normocephalic  EYES: EOMI, pupils equil  EARS: Bilateral Auricles Symmetric  NOSE: Nares patent bilaterally  ORAL CAVITY: Tongue mobile, Oropharynx clear, Floor of mouth clear, Posterior oropharynx normal  NECK: No palpable lymphadenopathy, thyroid not palpable, nontender    PROCEDURE: BILATERAL RIGID NASAL ENDOSCOPY WITH DEBRIDEMENT -as performed on 7/2/2025  Bilateral rigid nasal endoscopy with debridement (74569) was performed after nasal surgery in order to prevent infection, promote healing, and improve the nasal airway. Verbal consent was obtained from the patient to proceed with rigid nasal endoscopy. The nasal cavity was decongested and topically anesthetized with a  combination of Oxymetazoline and 4% Lidocaine. A rigid 4mm 30 degree nasal endoscope connected to a high-definition video endoscopy system was used to examine both nasal cavities. The inferior meatus, inferior turbinate, nasopharynx, middle meatus, middle turbinate, superior meatus, superior turbinate, and sphenoethmoidal recess were examined bilaterally, with any exceptions as noted below. At the completion of the procedure the endoscope was removed. The patient tolerated the procedure well. There were no complications.    Findings: Crusting, old clots, were removed endoscopically using a combination of Blakesley Forceps and Straight Suction to prevent infection, promote healing, and improve the nasal airway. The bilateral inferior turbinates were reduced and healing well with clot and thick mucus as well as some crusting removed from the body of the turbinates bilaterally as well as the axilla. The Septum was midline and healing quite well with nearly fully healed septal incision. The middle meatus was patent bilaterally. There were no obvious masses or polyps noted.      Results:     Laboratory Data:  Lab Results   Component Value Date    WBC 4.7 10/31/2024    HGB 14.8 10/31/2024    HCT 44.9 10/31/2024    .0 10/31/2024    CREATSERUM 1.03 05/19/2025    BUN 21 05/19/2025     05/19/2025    K 4.2 05/19/2025     05/19/2025    CO2 30.0 05/19/2025     (H) 05/19/2025    CA 10.2 05/19/2025    ALB 4.7 10/31/2024    ALKPHO 52 10/31/2024    TP 6.8 10/31/2024    AST 17 10/31/2024    ALT 18 10/31/2024    T4F 1.4 04/16/2025    TSH 2.471 04/16/2025    B12 1,647 (H) 05/19/2025         Imaging:  No results found.         Impression:       ICD-10-CM    1. Deviated nasal septum  J34.2       2. Hypertrophy of nasal turbinates  J34.3       3. Nasal congestion  R09.81       4. Nasal obstruction  J34.89       5. Allergic rhinitis due to pollen, unspecified seasonality  J30.1            Recommendations:      Nasal congestion and obstruction  Postoperative healing from  septoplasty and bilateral inferior turbinate submucosal resection with improved breathing. Continued mucus production due to healing process. No fever or headache reported.  - Continue hypertonic nasal washes  - Schedule follow-up in two weeks    Postnasal drip  Postnasal drip likely due to healing process after nasal surgery. No new symptoms such as fever or headache.  - Continue medicated nasal sprays as needed    Oral ulcer  New oral ulcer, first occurrence. No fever or other systemic symptoms. Expected to resolve in approximately two weeks.  - Monitor ulcer for healing        Thank you for allowing me to participate in the care of your patient.    Erik Real, DO   Otolaryngology/Rhinology, Sinus, and Endoscopic Skull Base Surgery  [address] [place] [id number]  Phone [phone]  Fax [phone]  5/6/2025  10:33 PM  7/2/2025     Abridge tool was used for dictation purposes only and the patient was not recorded at any point during the visit.          [1]   Past Medical History:   ALCOHOL USE    Quit in 2018    Allergic rhinitis    Anemia    Anxiety    Attention deficit hyperactivity disorder (ADHD)    Depression    Diabetes (HCC)    Esophageal reflux    High cholesterol    Hyperlipidemia   [2]   Past Surgical History:  Procedure Laterality Date    Upper gi endoscopy,exam     [3]   Family History  Problem Relation Age of Onset    Hyperlipidemia Mother     Depression Mother     Fibromyalgia Mother     Anemia Mother         On iron supplement    Hypertension Mother         On Ramipril and Eplerenone    Lipids Mother         High triglycerides as well. On Atorvastatin, Ezetimibe, Vascepa    Musculo-skelatal Disorder Mother         Fibromyalgia - On Milnacipran.    Obesity Mother         Accompanied with sleep apnea    Psychiatric Mother         ADHD, Obsessive Compulsive Disorder     Hyperlipidemia Father     Anxiety Father     Heart Disease Father     Diabetes Father         On Metformin, Empagliflozin, Semaglutide/Ozempic.    Bleeding Disorders Father         Low platelets - ITP    Hypertension Father         On Ramipril and Metoprolol    Lipids Father         On Rosuvastatin, Ezetimibe, Vascepa.    Psychiatric Father         Generalized Anxiety Disorder - On Sertraline, Aripiprazole, and Pregabalin.    Anemia Father         On iron supplement    Heart Disorder Father         Coronary Artery Disease. Triple bypass surgery in 2025. Calcium score above 2200    Depression Sister         On Levomilnacipran    Psychiatric Sister         ADHD - stimulant medication    Lipids Sister     Musculo-skelatal Disorder Sister     Asthma Maternal Grandmother     Obesity Maternal Grandmother     Stroke Paternal Grandfather    [4]   Current Outpatient Medications   Medication Sig Dispense Refill    Hypertonic Nasal Wash (SINUS RINSE BOTTLE KIT) Nasal Powd Pack Start on day 4 after surgery. Please use the Neilmed Sinus Rinse Squeeze Bottle. Fill the bottle to the dotted line with distilled or appropriately filtered water (DO NOT USE TAP WATER). Add in 1 Neilmed saline powder pouch. Lean over a sink and tilt your head down. Irrigate each nasal cavity with half of the bottle. Do this 2 times a day until you are told to stop. 1 each 0    Hypertonic Nasal Wash (SINUS RINSE KIT) Nasal Powd Pack Start on day 4 after surgery. Please use the Neilmed Sinus Rinse Squeeze Bottle. Fill the bottle to the dotted line with distilled or appropriately filtered water (DO NOT USE TAP WATER). Add in 1 Neilmed saline powder pouch. Lean over a sink and tilt your head down. Irrigate each nasal cavity with half of the bottle. Do this 2 times a day until you are told to stop. 50 each 2    ezetimibe 10 MG Oral Tab Take 1 tablet (10 mg total) by mouth Every afternoon at 2:00 pm.      famotidine 20 MG Oral Tab Take 1 tablet (20 mg total)  by mouth daily as needed for Heartburn.      Creatine Monohydrate 1.5 GM/15ML Oral Liquid Every afternoon at 2:00 pm.      Methylcellulose, Laxative, 500 MG Oral Tab Every afternoon at 2:00 pm.      Multiple Vitamins-Minerals (MULTIVITAMIN ADULT, MINERALS,) Oral Tab Every afternoon at 2:00 pm.      polyethylene glycol, PEG 3350, 17 GM/SCOOP Oral Powder Every afternoon at 2:00 pm.      Probiotic Product (PROBIOTIC 10 ULTRA STRENGTH) Oral Cap       ONETOUCH VERIO In Vitro Strip USE TO TEST BLOOD SUGAR ONCE DAILY AS DIRECTED 100 strip 0    Dulaglutide (TRULICITY) 1.5 MG/0.5ML Subcutaneous Solution Auto-injector Inject 1.5 mg into the skin once a week. 6 mL 0    rosuvastatin 10 MG Oral Tab Take 1 tablet (10 mg total) by mouth daily. (Patient taking differently: Take 1 tablet (10 mg total) by mouth Every afternoon at 2:00 pm.) 90 tablet 1    desloratadine 5 MG Oral Tab Take 1 tablet (5 mg total) by mouth daily. (Patient taking differently: Take 1 tablet (5 mg total) by mouth Every afternoon at 2:00 pm.) 90 tablet 1    ARIPiprazole 2 MG Oral Tab Take 1 tablet (2 mg total) by mouth Every afternoon at 2:00 pm.      azelastine 137 MCG/SPRAY Nasal Solution       busPIRone 15 MG Oral Tab at noon and before bedtime.      Ferrous Bisglycinate Chelate 28 MG Oral Cap Every afternoon at 2:00 pm.      fluticasone propionate 50 MCG/ACT Nasal Suspension 2 sprays by Each Nare route daily. 11.1 mL 1    JARDIANCE 25 MG Oral Tab Take 1 tablet (25 mg total) by mouth daily. 90 tablet 0    omega-3-acid ethyl esters 1 g Oral Cap Take 2 capsules (2 g total) by mouth 2 (two) times daily. (Patient taking differently: Take 2 capsules (2 g total) by mouth 2 (two) times daily. Taking it once a day) 360 capsule 0    Lancets (ONETOUCH DELICA PLUS [id number]) Does not apply Misc USE TO TEST BLOOD SUGAR ONCE DAILY 100 each 0    bisoprolol 5 MG Oral Tab Take 1 tablet (5 mg total) by mouth daily. (Patient taking differently: Take 1 tablet (5 mg total) by  mouth Every afternoon at 2:00 pm.) 90 tablet 1    triamcinolone 0.1 % External Ointment Apply 1 Application topically in the morning and 1 Application before bedtime.      Dupilumab 300 MG/2ML Subcutaneous Solution Auto-injector Inject 300 mg into the skin every 14 (fourteen) days.      Fluocinolone Acetonide Scalp 0.01 % External Oil Apply 1 Application topically daily. (Patient taking differently: Apply 1 Application topically as needed.)      Blood Glucose Monitoring Suppl (ONETOUCH VERIO REFLECT) w/Device Does not apply Kit       Amphetamine-Dextroamphet ER 20 MG Oral Capsule SR 24 Hr Take 1 capsule (20 mg total) by mouth Every afternoon at 2:00 pm.      Glucose Blood (ONETOUCH TEST VI)       lisdexamfetamine (VYVANSE) 50 MG Oral Cap Take 1 capsule (50 mg total) by mouth daily. (Patient taking differently: Take 1 capsule (50 mg total) by mouth Every afternoon at 2:00 pm.) 30 capsule 0    clobetasol 0.05 % External Ointment Apply topically 2 (two) times daily as needed.      amphetamine-dextroamphetamine 10 MG Oral Tab Take by mouth as needed.      FETZIMA 120 MG Oral Capsule SR 24 Hr Take 120 mg by mouth daily. (Patient taking differently: Take 120 mg by mouth Every afternoon at 2:00 pm.) 30 capsule 0    acetaminophen-codeine 300-30 MG Oral Tab Take 2 tablets by mouth every 6 (six) hours as needed for Pain. 30 tablet 0    oxymetazoline 0.05 % Nasal Solution 2 sprays by Nasal route 2 (two) times daily. each nostril morning and evening. Use for 3 days then stop 30 mL 0   [5]   Allergies  Allergen Reactions    Bupropion ANXIETY and Tightness in Chest    Clonidine CONFUSION, DIZZINESS and FATIGUE    Omeprazole NAUSEA AND VOMITING    Propranolol DIZZINESS     Disorientation    Atomoxetine OTHER (SEE COMMENTS)     Increases liver enzymes    Carbamazepine OTHER (SEE COMMENTS)     Fatigue    Citalopram OTHER (SEE COMMENTS)     Not effective  Fatigue    Clopidogrel OTHER (SEE COMMENTS)     Has a genetic mutation, that  makes medication ineffective.    Desipramine OTHER (SEE COMMENTS)     Excessive sleepiness    Methylphenidate OTHER (SEE COMMENTS)     headaches    Oxcarbazepine OTHER (SEE COMMENTS)     Was told by doctor to avoid it    Thiothixene OTHER (SEE COMMENTS)    Vilazodone OTHER (SEE COMMENTS)    Escitalopram FATIGUE and INSOMNIA    Fluoxetine FATIGUE    Paroxetine FATIGUE    Sertraline FATIGUE    Venlafaxine FATIGUE

## 2025-07-03 RX ORDER — EZETIMIBE 10 MG/1
10 TABLET ORAL
Qty: 90 TABLET | Refills: 0 | OUTPATIENT
Start: 2025-07-03

## 2025-07-15 DIAGNOSIS — E78.2 MIXED HYPERLIPIDEMIA: ICD-10-CM

## 2025-07-16 ENCOUNTER — OFFICE VISIT (OUTPATIENT)
Facility: LOCATION | Age: 35
End: 2025-07-16
Payer: MEDICAID

## 2025-07-16 DIAGNOSIS — J34.3 HYPERTROPHY OF NASAL TURBINATES: ICD-10-CM

## 2025-07-16 DIAGNOSIS — R09.82 POSTNASAL DRIP: ICD-10-CM

## 2025-07-16 DIAGNOSIS — J34.2 DEVIATED NASAL SEPTUM: Primary | ICD-10-CM

## 2025-07-16 DIAGNOSIS — R09.81 NASAL CONGESTION: ICD-10-CM

## 2025-07-16 DIAGNOSIS — H91.93 BILATERAL HEARING LOSS, UNSPECIFIED HEARING LOSS TYPE: ICD-10-CM

## 2025-07-16 DIAGNOSIS — J34.89 NASAL OBSTRUCTION: ICD-10-CM

## 2025-07-16 PROCEDURE — 31231 NASAL ENDOSCOPY DX: CPT | Performed by: STUDENT IN AN ORGANIZED HEALTH CARE EDUCATION/TRAINING PROGRAM

## 2025-07-16 PROCEDURE — 99024 POSTOP FOLLOW-UP VISIT: CPT | Performed by: STUDENT IN AN ORGANIZED HEALTH CARE EDUCATION/TRAINING PROGRAM

## 2025-07-16 RX ORDER — METFORMIN HYDROCHLORIDE 500 MG/1
TABLET, EXTENDED RELEASE ORAL
COMMUNITY

## 2025-07-16 RX ORDER — OMEGA-3-ACID ETHYL ESTERS 1 G/1
2 CAPSULE, LIQUID FILLED ORAL 2 TIMES DAILY
Qty: 360 CAPSULE | Refills: 0 | Status: SHIPPED | OUTPATIENT
Start: 2025-07-16

## 2025-07-16 RX ORDER — RABEPRAZOLE SODIUM 20 MG/1
TABLET, DELAYED RELEASE ORAL
COMMUNITY

## 2025-07-16 NOTE — PROGRESS NOTES
The following individual(s) verbally consented to be recorded using ambient AI listening technology and understand that they can each withdraw their consent to this listening technology at any point by asking the clinician to turn off or pause the recording: Patient consents    Patient name: Jamal Irving

## 2025-07-16 NOTE — TELEPHONE ENCOUNTER
Cholesterol Medication Protocol [id number]/15/2025 05:33 PM   Protocol Details ALT < 80    ALT resulted within past year    Lipid panel within past 12 months    In person appointment or virtual visit in the past 12 mos or appointment in next 3 mos    Medication is active on med list        Pt confirmed 2 capsules twice a day

## 2025-07-16 NOTE — PROGRESS NOTES
[place]  OTOLARYNGOLOGY - HEAD & NECK SURGERY    7/16/2025     Reason for Consultation:     Congestion, deviated septum    History of Present Illness:     History of Present Illness  Jamal Irving is a 34 year old male with allergic rhinitis who presents with chronic nasal congestion.    He has experienced chronic nasal congestion for many years, predominantly on the left side, associated with a history of allergic rhinitis and a confirmed allergy to dust mites.    He has attempted various allergy medications, including fluticasone nasal spray and oral antihistamines, which have provided approximately 30-40% improvement in symptoms. Despite this, significant nasal congestion persists.    The nasal congestion occasionally disrupts his sleep. He has not undergone any previous nasal surgery.      6/26/2025    Jamal Irving is a 34 year old male who presents for removal of nasal splints following septoplasty.    He experiences nasal congestion and difficulty sleeping, leading to significant fatigue over the past week. He remains mostly in bed due to tiredness. No snoring is present. Post-surgery, he developed a cough, which he associates with sinus issues. Sudafed provides immediate relief for the cough. Initially, he experienced constant coughing after surgery.      Jamal Irving is a 34 year old male who presents for postoperative follow-up after nasal surgery.    He has experienced significant improvement in energy levels and sleep quality over the past week following nasal surgery. He continues to perform nasal rinses as part of his postoperative care.    He describes experiencing significant mucus production, with postnasal drip causing a 'really bad taste' at the back of his throat. Prior to surgery, he had issues with postnasal drip and nasal congestion, which have improved postoperatively. He continues to use nasal spray once a day, which he had been using prior to surgery for these symptoms.    He mentions the  development of an oral ulcer two days ago, confirmed by a dentist. This is his first experience with an ulcer, and he describes it as painful. He initially thought it was a tooth issue.    No fever, headache, or recent illness in himself or those around him.    7/16/2025    Jamal Irving is a 34 year old male who presents for follow-up after septoplasty.    He reports significant improvement in nasal breathing since the septoplasty, with a reduction in scabs and crusts, particularly at the front of his nose. A dissolvable stitch, described as 'very stretchy,' came out today. Prior to the surgery, he experienced lethargy and daytime sleepiness, but now he has more energy. No snoring at night is reported.    He is concerned about potential hearing loss, which he attributes to frequent attendance at concerts without ear protection prior to COVID-19. He experiences occasional tinnitus and believes any hearing loss is equal in both ears.    His mother has a history of obstructive sleep apnea.    Past Medical History  Past Medical History[1]    Past Surgical History  Past Surgical History[2]    Family History  Family History[3]    Social History  Pediatric History   Patient Parents    Denzel Irving (Father)     Other Topics Concern    Caffeine Concern No    Exercise No    Seat Belt No    Special Diet No    Stress Concern Yes    Weight Concern Yes     Comment: Underweight   Social History Narrative    Not on file           Current Medications:  Current Medications[4]    Allergies  Allergies[5]    Review of Systems:   A comprehensive 10 point review of systems was completed.  Pertinent positives and negatives noted in the the HPI.    Physical Exam:   There were no vitals taken for this visit.    GENERAL: No acute distress, Comfortable appearing  FACE: HB 1/6, Normal Animation  HEAD: Normocephalic  EYES: EOMI, pupils equil  EARS: Bilateral Auricles Symmetric  NOSE: Nares patent bilaterally  ORAL CAVITY: Tongue mobile,  Oropharynx clear, Floor of mouth clear, Posterior oropharynx normal  NECK: No palpable lymphadenopathy, thyroid not palpable, nontender    PROCEDURE: BILATERAL RIGID NASAL ENDOSCOPY -as performed on 7/16/2025  Bilateral rigid nasal endoscopy with debridement (10039) was performed after nasal surgery in order to prevent infection, promote healing, and improve the nasal airway. Verbal consent was obtained from the patient to proceed with rigid nasal endoscopy. The nasal cavity was decongested and topically anesthetized with a combination of Oxymetazoline and 4% Lidocaine. A rigid 4mm 30 degree nasal endoscope connected to a high-definition video endoscopy system was used to examine both nasal cavities. The inferior meatus, inferior turbinate, nasopharynx, middle meatus, middle turbinate, superior meatus, superior turbinate, and sphenoethmoidal recess were examined bilaterally, with any exceptions as noted below. At the completion of the procedure the endoscope was removed. The patient tolerated the procedure well. There were no complications.    Findings: The bilateral inferior turbinates were reduced and healing well, minimal scabbing today. The Septum was midline and left caudal septal incision appears to be healing well. The middle meatus was patent bilaterally. There were no obvious masses or polyps noted.      Results:     Laboratory Data:  Lab Results   Component Value Date    WBC 4.7 10/31/2024    HGB 14.8 10/31/2024    HCT 44.9 10/31/2024    .0 10/31/2024    CREATSERUM 1.03 05/19/2025    BUN 21 05/19/2025     05/19/2025    K 4.2 05/19/2025     05/19/2025    CO2 30.0 05/19/2025     (H) 05/19/2025    CA 10.2 05/19/2025    ALB 4.7 10/31/2024    ALKPHO 52 10/31/2024    TP 6.8 10/31/2024    AST 17 10/31/2024    ALT 18 10/31/2024    T4F 1.4 04/16/2025    TSH 2.471 04/16/2025    B12 1,647 (H) 05/19/2025         Imaging:  No results found.         Impression:       ICD-10-CM    1. Deviated  nasal septum  J34.2       2. Hypertrophy of nasal turbinates  J34.3       3. Nasal congestion  R09.81       4. Nasal obstruction  J34.89       5. Allergic rhinitis due to pollen, unspecified seasonality  J30.1            Recommendations:     Deviated nasal septum  Post-operative healing from septoplasty is progressing well. Dissolvable stitch through the septum is expectedly coming out. Incision on the left side is healing nicely. Breathing has improved significantly with no significant snoring. Increased energy levels suggest improved sleep quality post-surgery.  - Consider a sleep study if fatigue persists despite improved nasal patency.    Nasal congestion and obstruction  Nasal congestion and obstruction have improved following septoplasty. Reduction in scabs and crusts, particularly in the anterior septal area. Nasal passages are open and healing well.  - Re-evaluate nasal patency and healing in four weeks.    Hearing loss  Possible hearing loss due to past exposure to loud concerts without ear protection. Reports occasional tinnitus and believes hearing loss is equal in both ears.  - Schedule a hearing test as a walk-in during the next visit in four weeks.        Thank you for allowing me to participate in the care of your patient.    Erik Real, DO   Otolaryngology/Rhinology, Sinus, and Endoscopic Skull Base Surgery  [facility] [address] [place] [id number]  Phone [phone]  Fax [phone]  5/6/2025  10:33 PM  7/16/2025     Abridge tool was used for dictation purposes only and the patient was not recorded at any point during the visit.          [1]   Past Medical History:   ALCOHOL USE    Quit in 2018    Allergic rhinitis    Anemia    Anxiety    Attention deficit hyperactivity disorder (ADHD)    Depression    Diabetes (HCC)    Esophageal reflux    High cholesterol    Hyperlipidemia   [2]   Past Surgical History:  Procedure Laterality Date    Upper gi  endoscopy,exam     [3]   Family History  Problem Relation Age of Onset    Hyperlipidemia Mother     Depression Mother     Fibromyalgia Mother     Anemia Mother         On iron supplement    Hypertension Mother         On Ramipril and Eplerenone    Lipids Mother         High triglycerides as well. On Atorvastatin, Ezetimibe, Vascepa    Musculo-skelatal Disorder Mother         Fibromyalgia - On Milnacipran.    Obesity Mother         Accompanied with sleep apnea    Psychiatric Mother         ADHD, Obsessive Compulsive Disorder    Hyperlipidemia Father     Anxiety Father     Heart Disease Father     Diabetes Father         On Metformin, Empagliflozin, Semaglutide/Ozempic.    Bleeding Disorders Father         Low platelets - ITP    Hypertension Father         On Ramipril and Metoprolol    Lipids Father         On Rosuvastatin, Ezetimibe, Vascepa.    Psychiatric Father         Generalized Anxiety Disorder - On Sertraline, Aripiprazole, and Pregabalin.    Anemia Father         On iron supplement    Heart Disorder Father         Coronary Artery Disease. Triple bypass surgery in 2025. Calcium score above 2200    Depression Sister         On Levomilnacipran    Psychiatric Sister         ADHD - stimulant medication    Lipids Sister     Musculo-skelatal Disorder Sister     Asthma Maternal Grandmother     Obesity Maternal Grandmother     Stroke Paternal Grandfather    [4]   Current Outpatient Medications   Medication Sig Dispense Refill    metFORMIN  MG Oral Tablet 24 Hr       RABEprazole Sodium 20 MG Oral Tab EC Take 1 tablet by mouth 2 (two) times daily. Take 30-60 minutes before breakfast and dinner.      bisoprolol 5 MG Oral Tab Take 1 tablet (5 mg total) by mouth Every afternoon at 2:00 pm. 90 tablet 0    empagliflozin (JARDIANCE) 25 MG Oral Tab Take 1 tablet (25 mg total) by mouth daily. 90 tablet 0    Hypertonic Nasal Wash (SINUS RINSE BOTTLE KIT) Nasal Powd Pack Start on day 4 after surgery. Please use the Neilmed  Sinus Rinse Squeeze Bottle. Fill the bottle to the dotted line with distilled or appropriately filtered water (DO NOT USE TAP WATER). Add in 1 Neilmed saline powder pouch. Lean over a sink and tilt your head down. Irrigate each nasal cavity with half of the bottle. Do this 2 times a day until you are told to stop. 1 each 0    ezetimibe 10 MG Oral Tab Take 1 tablet (10 mg total) by mouth Every afternoon at 2:00 pm.      famotidine 20 MG Oral Tab Take 1 tablet (20 mg total) by mouth daily as needed for Heartburn.      Creatine Monohydrate 1.5 GM/15ML Oral Liquid Every afternoon at 2:00 pm.      Methylcellulose, Laxative, 500 MG Oral Tab Every afternoon at 2:00 pm.      Multiple Vitamins-Minerals (MULTIVITAMIN ADULT, MINERALS,) Oral Tab Every afternoon at 2:00 pm.      polyethylene glycol, PEG 3350, 17 GM/SCOOP Oral Powder Every afternoon at 2:00 pm.      Probiotic Product (PROBIOTIC 10 ULTRA STRENGTH) Oral Cap       ONETOUCH VERIO In Vitro Strip USE TO TEST BLOOD SUGAR ONCE DAILY AS DIRECTED 100 strip 0    Dulaglutide (TRULICITY) 1.5 MG/0.5ML Subcutaneous Solution Auto-injector Inject 1.5 mg into the skin once a week. 6 mL 0    rosuvastatin 10 MG Oral Tab Take 1 tablet (10 mg total) by mouth daily. (Patient taking differently: Take 1 tablet (10 mg total) by mouth Every afternoon at 2:00 pm.) 90 tablet 1    desloratadine 5 MG Oral Tab Take 1 tablet (5 mg total) by mouth daily. (Patient taking differently: Take 1 tablet (5 mg total) by mouth Every afternoon at 2:00 pm.) 90 tablet 1    ARIPiprazole 2 MG Oral Tab Take 1 tablet (2 mg total) by mouth Every afternoon at 2:00 pm.      azelastine 137 MCG/SPRAY Nasal Solution       busPIRone 15 MG Oral Tab at noon and before bedtime.      Ferrous Bisglycinate Chelate 28 MG Oral Cap Every afternoon at 2:00 pm.      fluticasone propionate 50 MCG/ACT Nasal Suspension 2 sprays by Each Nare route daily. 11.1 mL 1    omega-3-acid ethyl esters 1 g Oral Cap Take 2 capsules (2 g total)  by mouth 2 (two) times daily. (Patient taking differently: Take 2 capsules (2 g total) by mouth 2 (two) times daily. Taking it once a day) 360 capsule 0    Lancets (ONETOUCH DELICA PLUS [id number]) Does not apply Misc USE TO TEST BLOOD SUGAR ONCE DAILY 100 each 0    triamcinolone 0.1 % External Ointment Apply 1 Application topically in the morning and 1 Application before bedtime.      Dupilumab 300 MG/2ML Subcutaneous Solution Auto-injector Inject 300 mg into the skin every 14 (fourteen) days.      Fluocinolone Acetonide Scalp 0.01 % External Oil Apply 1 Application topically daily. (Patient taking differently: Apply 1 Application topically as needed.)      Blood Glucose Monitoring Suppl (ONETOUCH VERIO REFLECT) w/Device Does not apply Kit       Amphetamine-Dextroamphet ER 20 MG Oral Capsule SR 24 Hr Take 1 capsule (20 mg total) by mouth Every afternoon at 2:00 pm.      Glucose Blood (ONETOUCH TEST VI)       lisdexamfetamine (VYVANSE) 50 MG Oral Cap Take 1 capsule (50 mg total) by mouth daily. (Patient taking differently: Take 1 capsule (50 mg total) by mouth Every afternoon at 2:00 pm.) 30 capsule 0    clobetasol 0.05 % External Ointment Apply topically 2 (two) times daily as needed.      amphetamine-dextroamphetamine 10 MG Oral Tab Take by mouth as needed.      FETZIMA 120 MG Oral Capsule SR 24 Hr Take 120 mg by mouth daily. (Patient taking differently: Take 120 mg by mouth Every afternoon at 2:00 pm.) 30 capsule 0   [5]   Allergies  Allergen Reactions    Bupropion ANXIETY and Tightness in Chest    Clonidine CONFUSION, DIZZINESS and FATIGUE    Omeprazole NAUSEA AND VOMITING    Propranolol DIZZINESS     Disorientation    Atomoxetine OTHER (SEE COMMENTS)     Increases liver enzymes    Carbamazepine OTHER (SEE COMMENTS)     Fatigue    Citalopram OTHER (SEE COMMENTS)     Not effective  Fatigue    Clopidogrel OTHER (SEE COMMENTS)     Has a genetic mutation, that makes medication ineffective.    Desipramine OTHER (SEE  COMMENTS)     Excessive sleepiness    Methylphenidate OTHER (SEE COMMENTS)     headaches    Oxcarbazepine OTHER (SEE COMMENTS)     Was told by doctor to avoid it    Thiothixene OTHER (SEE COMMENTS)    Vilazodone OTHER (SEE COMMENTS)    Escitalopram FATIGUE and INSOMNIA    Fluoxetine FATIGUE    Paroxetine FATIGUE    Sertraline FATIGUE    Venlafaxine FATIGUE

## 2025-07-19 ENCOUNTER — TELEPHONE (OUTPATIENT)
Dept: INTERNAL MEDICINE CLINIC | Facility: CLINIC | Age: 35
End: 2025-07-19

## 2025-07-19 NOTE — TELEPHONE ENCOUNTER
Incoming (mail or fax):  fax  Received from:  [facility]   Documentation given to:  Triage in    Trulicity 1.5MG/0.5ML Auto-injectors - rejected and requires prior authorization

## 2025-07-21 NOTE — TELEPHONE ENCOUNTER
Incoming (mail or fax):  fax   Received from:  [facility]  Documentation given to:  triage in     Approval for Trulicity

## 2025-07-31 DIAGNOSIS — E11.9 TYPE 2 DIABETES MELLITUS WITHOUT COMPLICATION, WITHOUT LONG-TERM CURRENT USE OF INSULIN (HCC): ICD-10-CM

## 2025-07-31 DIAGNOSIS — E78.00 HYPERCHOLESTEROLEMIA: ICD-10-CM

## 2025-08-01 RX ORDER — ROSUVASTATIN CALCIUM 10 MG/1
10 TABLET, COATED ORAL DAILY
Qty: 90 TABLET | Refills: 1 | OUTPATIENT
Start: 2025-08-01

## 2025-08-01 RX ORDER — DULAGLUTIDE 1.5 MG/.5ML
1.5 INJECTION, SOLUTION SUBCUTANEOUS WEEKLY
Qty: 6 ML | Refills: 0 | Status: SHIPPED | OUTPATIENT
Start: 2025-08-01

## 2025-08-01 RX ORDER — DESLORATADINE 5 MG/1
5 TABLET ORAL DAILY
Qty: 90 TABLET | Refills: 1 | OUTPATIENT
Start: 2025-08-01

## 2025-08-02 DIAGNOSIS — E11.9 TYPE 2 DIABETES MELLITUS WITHOUT COMPLICATION, WITHOUT LONG-TERM CURRENT USE OF INSULIN (HCC): ICD-10-CM

## 2025-08-06 RX ORDER — DULAGLUTIDE 1.5 MG/.5ML
1.5 INJECTION, SOLUTION SUBCUTANEOUS WEEKLY
Qty: 6 ML | Refills: 0 | Status: SHIPPED | OUTPATIENT
Start: 2025-08-06 | End: 2025-08-07

## 2025-08-07 RX ORDER — DULAGLUTIDE 1.5 MG/.5ML
1.5 INJECTION, SOLUTION SUBCUTANEOUS WEEKLY
Qty: 6 ML | Refills: 0 | Status: SHIPPED | OUTPATIENT
Start: 2025-08-07

## 2025-08-11 ENCOUNTER — OFFICE VISIT (OUTPATIENT)
Dept: ENDOCRINOLOGY CLINIC | Facility: CLINIC | Age: 35
End: 2025-08-11

## 2025-08-11 VITALS
BODY MASS INDEX: 19.65 KG/M2 | HEIGHT: 67 IN | DIASTOLIC BLOOD PRESSURE: 78 MMHG | SYSTOLIC BLOOD PRESSURE: 102 MMHG | WEIGHT: 125.19 LBS | HEART RATE: 84 BPM

## 2025-08-11 DIAGNOSIS — E11.65 TYPE 2 DIABETES MELLITUS WITH HYPERGLYCEMIA, WITHOUT LONG-TERM CURRENT USE OF INSULIN (HCC): Primary | ICD-10-CM

## 2025-08-11 DIAGNOSIS — R53.83 FATIGUE, UNSPECIFIED TYPE: ICD-10-CM

## 2025-08-11 DIAGNOSIS — E78.5 DYSLIPIDEMIA: ICD-10-CM

## 2025-08-11 LAB
CARTRIDGE EXPIRATION DATE: ABNORMAL DATE
GLUCOSE BLOOD: 115
HEMOGLOBIN A1C: 5.7 % (ref 4.3–5.6)
TEST STRIP EXPIRATION DATE: NORMAL DATE
TEST STRIP LOT #: NORMAL NUMERIC

## 2025-08-11 PROCEDURE — 99204 OFFICE O/P NEW MOD 45 MIN: CPT | Performed by: INTERNAL MEDICINE

## 2025-08-11 PROCEDURE — 82947 ASSAY GLUCOSE BLOOD QUANT: CPT | Performed by: INTERNAL MEDICINE

## 2025-08-11 PROCEDURE — 83036 HEMOGLOBIN GLYCOSYLATED A1C: CPT | Performed by: INTERNAL MEDICINE

## 2025-08-11 RX ORDER — PIOGLITAZONE 15 MG/1
15 TABLET ORAL DAILY
Qty: 90 TABLET | Refills: 1 | Status: SHIPPED | OUTPATIENT
Start: 2025-08-11

## 2025-08-13 ENCOUNTER — OFFICE VISIT (OUTPATIENT)
Facility: LOCATION | Age: 35
End: 2025-08-13

## 2025-08-13 DIAGNOSIS — H91.90 HEARING LOSS, UNSPECIFIED HEARING LOSS TYPE, UNSPECIFIED LATERALITY: ICD-10-CM

## 2025-08-13 DIAGNOSIS — H93.293 ABNORMAL AUDITORY PERCEPTION, BILATERAL: ICD-10-CM

## 2025-08-13 DIAGNOSIS — H91.93 BILATERAL HEARING LOSS, UNSPECIFIED HEARING LOSS TYPE: Primary | ICD-10-CM

## 2025-08-13 DIAGNOSIS — J34.2 DEVIATED NASAL SEPTUM: ICD-10-CM

## 2025-08-13 DIAGNOSIS — R09.81 NASAL CONGESTION: ICD-10-CM

## 2025-08-13 DIAGNOSIS — H93.25 AUDITORY PROCESSING DISORDER: Primary | ICD-10-CM

## 2025-08-13 DIAGNOSIS — J34.3 HYPERTROPHY OF NASAL TURBINATES: ICD-10-CM

## 2025-08-13 DIAGNOSIS — R09.82 POSTNASAL DRIP: ICD-10-CM

## 2025-08-13 PROCEDURE — 92567 TYMPANOMETRY: CPT | Performed by: AUDIOLOGIST

## 2025-08-13 PROCEDURE — 92557 COMPREHENSIVE HEARING TEST: CPT | Performed by: AUDIOLOGIST

## 2025-08-13 PROCEDURE — 99213 OFFICE O/P EST LOW 20 MIN: CPT | Performed by: STUDENT IN AN ORGANIZED HEALTH CARE EDUCATION/TRAINING PROGRAM

## 2025-08-13 PROCEDURE — 31231 NASAL ENDOSCOPY DX: CPT | Performed by: STUDENT IN AN ORGANIZED HEALTH CARE EDUCATION/TRAINING PROGRAM

## 2025-08-14 PROBLEM — R53.83 FATIGUE: Status: ACTIVE | Noted: 2025-08-14

## 2025-08-14 PROBLEM — E11.65 TYPE 2 DIABETES MELLITUS WITH HYPERGLYCEMIA, WITHOUT LONG-TERM CURRENT USE OF INSULIN (HCC): Status: ACTIVE | Noted: 2025-08-14

## 2025-08-14 PROBLEM — E78.5 DYSLIPIDEMIA: Status: ACTIVE | Noted: 2025-08-14

## (undated) DEVICE — SUCTION COAGULATOR: Brand: VALLEYLAB

## (undated) DEVICE — SYRINGE MED 10ML LL TIP W/O SFTY DISP

## (undated) DEVICE — GAMMEX® PI HYBRID SIZE 7.5, STERILE POWDER-FREE SURGICAL GLOVE, POLYISOPRENE AND NEOPRENE BLEND: Brand: GAMMEX

## (undated) DEVICE — SOLUTION IRRIG 1000ML 0.9% NACL USP BTL

## (undated) DEVICE — SUCTION CANISTER, 3000CC,SAFELINER: Brand: DEROYAL

## (undated) DEVICE — HEAD AND NECK: Brand: MEDLINE INDUSTRIES, INC.

## (undated) DEVICE — MEDI-VAC NON-CONDUCTIVE SUCTION TUBING: Brand: CARDINAL HEALTH

## (undated) DEVICE — BLADE 1884380EM QUADCUT 4.3MMX13CM ROHS: Brand: ROTATABLE FUSION®

## (undated) DEVICE — SPLINT 1524050 5PK PAIR DOYLE II AIRWAY: Brand: DOYLE II ™

## (undated) DEVICE — PAD,EYE,LARGE,2 1/8"X2 5/8",STERILE,LF: Brand: MEDLINE

## (undated) DEVICE — BLADE 1884004 TRICUT 5PK 4MM: Brand: TRICUT®

## (undated) DEVICE — BLADE 1882040HR 5PK M4 INF TURB 2MM ROT: Brand: STRAIGHTSHOT

## (undated) DEVICE — SUT PLN GUT 4-0 18IN SC-1 ABSRB TAN YELLOWISH

## (undated) DEVICE — PACK CUSTOM NASAL ACCESSORY

## (undated) DEVICE — SUT PERMA- 2-0 30IN SH NABSRB BLK L26MM 1/

## (undated) NOTE — LETTER
Diabetes Retinal Eye Examination Report    Patient Name: Jamal Irving                                        : 1990    Referring Physician: Irina Nowak MD  _____________________________________________________________________________  Patient - Please bring this form to your next eye examination appointment.   Give it to your eye care professional to fill out and return via fax to your primary care provider.     Eye Care Professional - Please fill out this form and fax it back to the referring  primary care physician.   _____________________________________________________________________________     Date of Exam: ___/___/___    The patient received a dilated fundus examination with the following results:    ___ No diabetic retinopathy detected  ___ Background retinopathy was detected, but only requires monitoring  ___ Retinopathy requiring further testing and/or treatment was detected. See notes below.    Notes / Commentary / Recommendations:  _________________________________________________________________________________________________________________________________________________________________________________________________________________________________    The patient is to return for follow-up / evaluation in ____ months.    Eye Care Provider (Print): _______________________Signature___________________ Date ___ / ___/___    Clinic/Office name: _______________________Ph:_____________Fax:_____________

## (undated) NOTE — Clinical Note
Diabetes Retinal Eye Examination Report    Patient Name: Jamal Irving                                        : 1990    Referring Physician: Irina Nowak MD  _____________________________________________________________________________  Patient - Please bring this form to your next eye examination appointment.   Give it to your eye care professional to fill out and return via fax to your primary care provider.     Eye Care Professional - Please fill out this form and fax it back to the referring  primary care physician.   _____________________________________________________________________________     Date of Exam: ___/___/___    The patient received a dilated fundus examination with the following results:    ___ No diabetic retinopathy detected  ___ Background retinopathy was detected, but only requires monitoring  ___ Retinopathy requiring further testing and/or treatment was detected. See notes below.    Notes / Commentary / Recommendations:  _________________________________________________________________________________________________________________________________________________________________________________________________________________________________    The patient is to return for follow-up / evaluation in ____ months.    Eye Care Provider (Print): _______________________Signature___________________ Date ___ / ___/___    Clinic/Office name: _______________________Ph:_____________Fax:_____________